# Patient Record
Sex: FEMALE | Race: WHITE | NOT HISPANIC OR LATINO | Employment: OTHER | URBAN - METROPOLITAN AREA
[De-identification: names, ages, dates, MRNs, and addresses within clinical notes are randomized per-mention and may not be internally consistent; named-entity substitution may affect disease eponyms.]

---

## 2019-10-21 ENCOUNTER — HOSPITAL ENCOUNTER (OUTPATIENT)
Dept: GENERAL RADIOLOGY | Facility: HOSPITAL | Age: 84
Discharge: HOME | End: 2019-10-21
Admitting: NURSE PRACTITIONER

## 2019-10-21 ENCOUNTER — TRANSCRIBE ORDERS (OUTPATIENT)
Dept: ADMINISTRATIVE | Facility: HOSPITAL | Age: 84
End: 2019-10-21

## 2019-10-21 DIAGNOSIS — R05.9 COUGH: Primary | ICD-10-CM

## 2019-10-21 PROCEDURE — 71046 X-RAY EXAM CHEST 2 VIEWS: CPT

## 2021-09-12 ENCOUNTER — APPOINTMENT (OUTPATIENT)
Dept: CARDIOLOGY | Facility: HOSPITAL | Age: 86
End: 2021-09-12

## 2021-09-12 ENCOUNTER — APPOINTMENT (OUTPATIENT)
Dept: CT IMAGING | Facility: HOSPITAL | Age: 86
End: 2021-09-12

## 2021-09-12 ENCOUNTER — APPOINTMENT (OUTPATIENT)
Dept: MRI IMAGING | Facility: HOSPITAL | Age: 86
End: 2021-09-12

## 2021-09-12 ENCOUNTER — APPOINTMENT (OUTPATIENT)
Dept: ULTRASOUND IMAGING | Facility: HOSPITAL | Age: 86
End: 2021-09-12

## 2021-09-12 ENCOUNTER — APPOINTMENT (OUTPATIENT)
Dept: GENERAL RADIOLOGY | Facility: HOSPITAL | Age: 86
End: 2021-09-12

## 2021-09-12 ENCOUNTER — HOSPITAL ENCOUNTER (INPATIENT)
Facility: HOSPITAL | Age: 86
LOS: 5 days | Discharge: HOME HEALTH | End: 2021-09-17
Attending: EMERGENCY MEDICINE | Admitting: RADIOLOGY

## 2021-09-12 DIAGNOSIS — I63.9 ISCHEMIC CEREBROVASCULAR ACCIDENT (CVA) (HCC): ICD-10-CM

## 2021-09-12 DIAGNOSIS — I63.9 ACUTE CVA (CEREBROVASCULAR ACCIDENT) (HCC): Primary | ICD-10-CM

## 2021-09-12 DIAGNOSIS — I10 ESSENTIAL HYPERTENSION: ICD-10-CM

## 2021-09-12 DIAGNOSIS — R13.12 OROPHARYNGEAL DYSPHAGIA: ICD-10-CM

## 2021-09-12 DIAGNOSIS — I48.11 LONGSTANDING PERSISTENT ATRIAL FIBRILLATION (HCC): ICD-10-CM

## 2021-09-12 DIAGNOSIS — R41.841 COGNITIVE COMMUNICATION DEFICIT: ICD-10-CM

## 2021-09-12 PROBLEM — I48.21 PERMANENT ATRIAL FIBRILLATION (HCC): Status: ACTIVE | Noted: 2021-09-12

## 2021-09-12 PROBLEM — N18.32 STAGE 3B CHRONIC KIDNEY DISEASE (HCC): Status: ACTIVE | Noted: 2021-09-12

## 2021-09-12 PROBLEM — E11.9 TYPE 2 DIABETES MELLITUS (HCC): Status: ACTIVE | Noted: 2021-09-12

## 2021-09-12 PROBLEM — E03.9 HYPOTHYROIDISM (ACQUIRED): Status: ACTIVE | Noted: 2021-09-12

## 2021-09-12 LAB
ALT SERPL W P-5'-P-CCNC: 12 U/L (ref 1–33)
APTT PPP: 30.2 SECONDS (ref 50–95)
AST SERPL-CCNC: 17 U/L (ref 1–32)
BASE EXCESS BLDA CALC-SCNC: 6 MMOL/L (ref -5–5)
BASOPHILS # BLD AUTO: 0.05 10*3/MM3 (ref 0–0.2)
BASOPHILS NFR BLD AUTO: 0.6 % (ref 0–1.5)
CA-I BLDA-SCNC: 1.21 MMOL/L (ref 1.2–1.32)
CO2 BLDA-SCNC: 31 MMOL/L (ref 24–29)
CREAT BLDA-MCNC: 1.5 MG/DL (ref 0.6–1.3)
DEPRECATED RDW RBC AUTO: 49.4 FL (ref 37–54)
EOSINOPHIL # BLD AUTO: 0.26 10*3/MM3 (ref 0–0.4)
EOSINOPHIL NFR BLD AUTO: 3.1 % (ref 0.3–6.2)
ERYTHROCYTE [DISTWIDTH] IN BLOOD BY AUTOMATED COUNT: 15.9 % (ref 12.3–15.4)
GLUCOSE BLDC GLUCOMTR-MCNC: 104 MG/DL (ref 70–130)
GLUCOSE BLDC GLUCOMTR-MCNC: 257 MG/DL (ref 70–130)
GLUCOSE BLDC GLUCOMTR-MCNC: 262 MG/DL (ref 70–130)
HCO3 BLDA-SCNC: 30 MMOL/L (ref 22–26)
HCT VFR BLD AUTO: 35.3 % (ref 34–46.6)
HCT VFR BLDA CALC: 42 % (ref 38–51)
HGB BLD-MCNC: 11.6 G/DL (ref 12–15.9)
HGB BLDA-MCNC: 14.3 G/DL (ref 12–17)
HOLD SPECIMEN: NORMAL
IMM GRANULOCYTES # BLD AUTO: 0.03 10*3/MM3 (ref 0–0.05)
IMM GRANULOCYTES NFR BLD AUTO: 0.4 % (ref 0–0.5)
INR PPP: 1 (ref 0.8–1.2)
LYMPHOCYTES # BLD AUTO: 1.44 10*3/MM3 (ref 0.7–3.1)
LYMPHOCYTES NFR BLD AUTO: 17.3 % (ref 19.6–45.3)
MCH RBC QN AUTO: 27.8 PG (ref 26.6–33)
MCHC RBC AUTO-ENTMCNC: 32.9 G/DL (ref 31.5–35.7)
MCV RBC AUTO: 84.7 FL (ref 79–97)
MONOCYTES # BLD AUTO: 0.73 10*3/MM3 (ref 0.1–0.9)
MONOCYTES NFR BLD AUTO: 8.8 % (ref 5–12)
NEUTROPHILS NFR BLD AUTO: 5.8 10*3/MM3 (ref 1.7–7)
NEUTROPHILS NFR BLD AUTO: 69.8 % (ref 42.7–76)
NRBC BLD AUTO-RTO: 0 /100 WBC (ref 0–0.2)
PCO2 BLDA: 42.9 MM HG (ref 35–45)
PH BLDA: 7.45 PH UNITS (ref 7.35–7.6)
PLATELET # BLD AUTO: 194 10*3/MM3 (ref 140–450)
PMV BLD AUTO: 10.8 FL (ref 6–12)
PO2 BLDA: 42 MMHG (ref 80–105)
POTASSIUM BLDA-SCNC: 4.6 MMOL/L (ref 3.5–4.9)
PROTHROMBIN TIME: 11.8 SECONDS (ref 12.8–15.2)
QT INTERVAL: 446 MS
QTC INTERVAL: 524 MS
RBC # BLD AUTO: 4.17 10*6/MM3 (ref 3.77–5.28)
SAO2 % BLDA: 80 % (ref 95–98)
SARS-COV-2 RDRP RESP QL NAA+PROBE: NORMAL
SODIUM BLD-SCNC: 136 MMOL/L (ref 138–146)
TROPONIN T SERPL-MCNC: <0.01 NG/ML (ref 0–0.03)
WBC # BLD AUTO: 8.31 10*3/MM3 (ref 3.4–10.8)
WHOLE BLOOD HOLD SPECIMEN: NORMAL
WHOLE BLOOD HOLD SPECIMEN: NORMAL

## 2021-09-12 PROCEDURE — C1769 GUIDE WIRE: HCPCS | Performed by: RADIOLOGY

## 2021-09-12 PROCEDURE — 76775 US EXAM ABDO BACK WALL LIM: CPT

## 2021-09-12 PROCEDURE — 85610 PROTHROMBIN TIME: CPT

## 2021-09-12 PROCEDURE — 92523 SPEECH SOUND LANG COMPREHEN: CPT

## 2021-09-12 PROCEDURE — 84484 ASSAY OF TROPONIN QUANT: CPT

## 2021-09-12 PROCEDURE — 84450 TRANSFERASE (AST) (SGOT): CPT

## 2021-09-12 PROCEDURE — 99291 CRITICAL CARE FIRST HOUR: CPT | Performed by: PSYCHIATRY & NEUROLOGY

## 2021-09-12 PROCEDURE — 93306 TTE W/DOPPLER COMPLETE: CPT | Performed by: INTERNAL MEDICINE

## 2021-09-12 PROCEDURE — 93306 TTE W/DOPPLER COMPLETE: CPT

## 2021-09-12 PROCEDURE — C2628 CATHETER, OCCLUSION: HCPCS | Performed by: RADIOLOGY

## 2021-09-12 PROCEDURE — 82565 ASSAY OF CREATININE: CPT

## 2021-09-12 PROCEDURE — 0 IOPAMIDOL PER 1 ML: Performed by: EMERGENCY MEDICINE

## 2021-09-12 PROCEDURE — 84460 ALANINE AMINO (ALT) (SGPT): CPT

## 2021-09-12 PROCEDURE — C1887 CATHETER, GUIDING: HCPCS | Performed by: RADIOLOGY

## 2021-09-12 PROCEDURE — 63710000001 INSULIN REGULAR HUMAN PER 5 UNITS: Performed by: INTERNAL MEDICINE

## 2021-09-12 PROCEDURE — C1894 INTRO/SHEATH, NON-LASER: HCPCS | Performed by: RADIOLOGY

## 2021-09-12 PROCEDURE — 82947 ASSAY GLUCOSE BLOOD QUANT: CPT

## 2021-09-12 PROCEDURE — 87635 SARS-COV-2 COVID-19 AMP PRB: CPT | Performed by: EMERGENCY MEDICINE

## 2021-09-12 PROCEDURE — 03CG3Z7 EXTIRPATION OF MATTER FROM INTRACRANIAL ARTERY USING STENT RETRIEVER, PERCUTANEOUS APPROACH: ICD-10-PCS | Performed by: RADIOLOGY

## 2021-09-12 PROCEDURE — 82803 BLOOD GASES ANY COMBINATION: CPT

## 2021-09-12 PROCEDURE — C1760 CLOSURE DEV, VASC: HCPCS | Performed by: RADIOLOGY

## 2021-09-12 PROCEDURE — 61645 PERQ ART M-THROMBECT &/NFS: CPT | Performed by: RADIOLOGY

## 2021-09-12 PROCEDURE — 85730 THROMBOPLASTIN TIME PARTIAL: CPT

## 2021-09-12 PROCEDURE — 0042T HC CT CEREBRAL PERFUSION W/WO CONTRAST: CPT

## 2021-09-12 PROCEDURE — C1773 RET DEV, INSERTABLE: HCPCS | Performed by: RADIOLOGY

## 2021-09-12 PROCEDURE — 84132 ASSAY OF SERUM POTASSIUM: CPT

## 2021-09-12 PROCEDURE — 93005 ELECTROCARDIOGRAM TRACING: CPT | Performed by: INTERNAL MEDICINE

## 2021-09-12 PROCEDURE — 99285 EMERGENCY DEPT VISIT HI MDM: CPT

## 2021-09-12 PROCEDURE — 92610 EVALUATE SWALLOWING FUNCTION: CPT

## 2021-09-12 PROCEDURE — 85014 HEMATOCRIT: CPT

## 2021-09-12 PROCEDURE — 99223 1ST HOSP IP/OBS HIGH 75: CPT | Performed by: INTERNAL MEDICINE

## 2021-09-12 PROCEDURE — 70450 CT HEAD/BRAIN W/O DYE: CPT

## 2021-09-12 PROCEDURE — 70498 CT ANGIOGRAPHY NECK: CPT

## 2021-09-12 PROCEDURE — 82330 ASSAY OF CALCIUM: CPT

## 2021-09-12 PROCEDURE — 70496 CT ANGIOGRAPHY HEAD: CPT

## 2021-09-12 PROCEDURE — 85025 COMPLETE CBC W/AUTO DIFF WBC: CPT

## 2021-09-12 PROCEDURE — 93010 ELECTROCARDIOGRAM REPORT: CPT | Performed by: INTERNAL MEDICINE

## 2021-09-12 PROCEDURE — 0 IODIXANOL PER 1 ML: Performed by: RADIOLOGY

## 2021-09-12 PROCEDURE — 84295 ASSAY OF SERUM SODIUM: CPT

## 2021-09-12 PROCEDURE — 93005 ELECTROCARDIOGRAM TRACING: CPT

## 2021-09-12 PROCEDURE — 70551 MRI BRAIN STEM W/O DYE: CPT

## 2021-09-12 RX ORDER — SODIUM CHLORIDE 0.9 % (FLUSH) 0.9 %
10 SYRINGE (ML) INJECTION EVERY 12 HOURS SCHEDULED
Status: DISCONTINUED | OUTPATIENT
Start: 2021-09-12 | End: 2021-09-17 | Stop reason: HOSPADM

## 2021-09-12 RX ORDER — LISINOPRIL 5 MG/1
5 TABLET ORAL DAILY
COMMUNITY
End: 2021-09-17 | Stop reason: HOSPADM

## 2021-09-12 RX ORDER — ASPIRIN 81 MG/1
81 TABLET, CHEWABLE ORAL DAILY
COMMUNITY

## 2021-09-12 RX ORDER — ASPIRIN 81 MG/1
81 TABLET, CHEWABLE ORAL DAILY
Status: DISCONTINUED | OUTPATIENT
Start: 2021-09-12 | End: 2021-09-17 | Stop reason: HOSPADM

## 2021-09-12 RX ORDER — ATORVASTATIN CALCIUM 40 MG/1
80 TABLET, FILM COATED ORAL NIGHTLY
Status: DISCONTINUED | OUTPATIENT
Start: 2021-09-12 | End: 2021-09-17 | Stop reason: HOSPADM

## 2021-09-12 RX ORDER — IODIXANOL 320 MG/ML
INJECTION, SOLUTION INTRAVASCULAR AS NEEDED
Status: DISCONTINUED | OUTPATIENT
Start: 2021-09-12 | End: 2021-09-12 | Stop reason: HOSPADM

## 2021-09-12 RX ORDER — MULTIVIT AND MINERALS-FERROUS GLUCONATE 9 MG IRON/15 ML ORAL LIQUID 9 MG/15 ML
LIQUID (ML) ORAL DAILY
COMMUNITY

## 2021-09-12 RX ORDER — ASPIRIN 300 MG/1
300 SUPPOSITORY RECTAL DAILY
Status: DISCONTINUED | OUTPATIENT
Start: 2021-09-12 | End: 2021-09-14

## 2021-09-12 RX ORDER — LEVOTHYROXINE SODIUM 20 UG/ML
50 INJECTION, SOLUTION INTRAVENOUS
Status: DISCONTINUED | OUTPATIENT
Start: 2021-09-12 | End: 2021-09-13

## 2021-09-12 RX ORDER — SODIUM CHLORIDE 0.9 % (FLUSH) 0.9 %
10 SYRINGE (ML) INJECTION AS NEEDED
Status: DISCONTINUED | OUTPATIENT
Start: 2021-09-12 | End: 2021-09-13

## 2021-09-12 RX ORDER — ATORVASTATIN CALCIUM 10 MG/1
10 TABLET, FILM COATED ORAL DAILY
COMMUNITY
End: 2021-09-17 | Stop reason: HOSPADM

## 2021-09-12 RX ORDER — SODIUM CHLORIDE 0.9 % (FLUSH) 0.9 %
10 SYRINGE (ML) INJECTION AS NEEDED
Status: DISCONTINUED | OUTPATIENT
Start: 2021-09-12 | End: 2021-09-17 | Stop reason: HOSPADM

## 2021-09-12 RX ORDER — PANTOPRAZOLE SODIUM 40 MG/1
40 TABLET, DELAYED RELEASE ORAL DAILY
COMMUNITY

## 2021-09-12 RX ORDER — LIDOCAINE HYDROCHLORIDE 10 MG/ML
INJECTION, SOLUTION EPIDURAL; INFILTRATION; INTRACAUDAL; PERINEURAL AS NEEDED
Status: DISCONTINUED | OUTPATIENT
Start: 2021-09-12 | End: 2021-09-12 | Stop reason: HOSPADM

## 2021-09-12 RX ORDER — LEVOTHYROXINE SODIUM 0.05 MG/1
25 TABLET ORAL DAILY
COMMUNITY

## 2021-09-12 RX ORDER — METOPROLOL TARTRATE 5 MG/5ML
5 INJECTION INTRAVENOUS EVERY 6 HOURS PRN
Status: DISCONTINUED | OUTPATIENT
Start: 2021-09-12 | End: 2021-09-15

## 2021-09-12 RX ORDER — DEXTROSE MONOHYDRATE 25 G/50ML
25 INJECTION, SOLUTION INTRAVENOUS
Status: DISCONTINUED | OUTPATIENT
Start: 2021-09-12 | End: 2021-09-14

## 2021-09-12 RX ORDER — NICOTINE POLACRILEX 4 MG
15 LOZENGE BUCCAL
Status: DISCONTINUED | OUTPATIENT
Start: 2021-09-12 | End: 2021-09-14

## 2021-09-12 RX ADMIN — ATORVASTATIN CALCIUM 80 MG: 40 TABLET, FILM COATED ORAL at 21:44

## 2021-09-12 RX ADMIN — IOPAMIDOL 115 ML: 755 INJECTION, SOLUTION INTRAVENOUS at 11:50

## 2021-09-12 RX ADMIN — LEVOTHYROXINE SODIUM 50 MCG: 20 INJECTION, SOLUTION INTRAVENOUS at 18:37

## 2021-09-12 RX ADMIN — ASPIRIN 81 MG CHEWABLE TABLET 81 MG: 81 TABLET CHEWABLE at 18:38

## 2021-09-12 RX ADMIN — INSULIN HUMAN 6 UNITS: 100 INJECTION, SOLUTION PARENTERAL at 18:38

## 2021-09-12 NOTE — H&P
ICU ADMISSION NOTE    Chief complaint   Left middle cerebral artery stroke  Atrial fibrillation on Eliquis    Subjective     Patient is a 85 y.o. female with diabetes mellitus type 2, hypertension, chronic atrial fibrillation on Eliquis, found by family unable to speak this morning.  She was in her normal state of health going to bed last night around 10 PM.  She awoke and was sitting at the table attempting to eat breakfast when her son came to check on her around 10 and found that she was unable to speak and had a right facial droop.  NIH stroke score was a 10 in the emergency room.  Scanning revealed a 1.1 cm lesion in the left temporal region suspicious for meningioma, small area of reversible ischemia in the periphery of the first branch of the left MCA.  She was taken to the Cath Lab and underwent a successful thrombectomy.  She is followed at the [facility] and was hospitalized in [place] July 3 with shortness of air, atrial fibrillation, congestive heart failure and a kidney stone.  At that time she reportedly had bilateral pleural effusions congestive heart failure and chronic atrial fibrillation.  She was treated for heart failure and sent to [place] for her kidney stone which she did successfully pass.  She reports that she is supposed to see a cardiologist about a possible pacemaker.  She has the atrial fibrillation and slow heart rate at times but she did not want to get a pacemaker.  At follow-up July 12 lisinopril and Eliquis was stopped.  She also has a history of an MI with heart catheterization and stent 10 or 12 years ago.  She is not sure why her Plavix was stopped.  Her serum creatinine was 1.46 with a GFR of 34.  Her A1c was 8.3 and her TSH was 4.3.  In our emergency room troponin was negative, hemoglobin was 11.6, Covid was negative, and the rest of the labs are pending.    Review of Systems  Review of Systems   Constitutional: Positive for activity change. Negative for  fever.   HENT: Positive for trouble swallowing.    Eyes: Negative for visual disturbance.   Respiratory: Negative for cough and shortness of breath.    Cardiovascular: Positive for palpitations. Negative for chest pain and leg swelling.   Gastrointestinal: Negative for abdominal pain, diarrhea, nausea and vomiting.   Endocrine: Positive for polydipsia.   Genitourinary: Negative for dysuria.   Musculoskeletal: Negative.    Skin: Negative.    Allergic/Immunologic: Negative.    Neurological: Positive for weakness and numbness.   Hematological: Negative.    Psychiatric/Behavioral: Negative.         Home Medications  No medications prior to admission.   Protonix 40 mg daily  Lopressor 25 mg twice daily  Lantus 35 units every morning  Lipitor 10 mg daily  Aspirin 81 mg daily  Eliquis 2.5 mg daily  Demadex 40 mg daily  Synthroid 50 mcg daily    History  Past Medical History:   Diagnosis Date   • Congestive heart failure (CHF) (CMS/[facility])    • Coronary artery disease     MI,  or    • Essential hypertension 2021   • Hypothyroid    • Hypothyroidism (acquired) 2021   • Nephrolithiasis    • Permanent atrial fibrillation (CMS/[facility]) on Eliquis 2021   • Type 2 diabetes mellitus (CMS/HCC) 2021     Past Surgical History:   Procedure Laterality Date   • APPENDECTOMY      Performed at the time of her hysterectomy   • CARDIAC CATHETERIZATION       or  with stent she does not know details   • CATARACT EXTRACTION, BILATERAL     •  SECTION      X2   • TONSILLECTOMY     • TOTAL ABDOMINAL HYSTERECTOMY WITH SALPINGO OOPHORECTOMY      For ovarian cancer     History reviewed. No pertinent family history.  Social History     Tobacco Use   • Smoking status: Never Smoker   • Smokeless tobacco: Never Used   Substance Use Topics   • Alcohol use: Never   • Drug use: Not on file     No medications prior to admission.     Allergies:  Patient has no known allergies.      Objective     Vital Signs  Blood pressure  "113/49, pulse 80, temperature 97.9 °F (36.6 °C), temperature source Axillary, resp. rate 16, height 149.9 cm (59\"), weight 68 kg (150 lb), SpO2 97 %.    Physical Exam:  General Appearance:   Elderly woman upright in bed in no acute distress   Head:   Normocephalic, atraumatic   Eyes:          Pupils equal and reactive to light.  Conjunctiva pink.   Ears:     Throat:  Oral mucosa moist, dentures in place   Neck:  Trachea midline, limited range of motion, no carotid bruit   Back:      Lungs:    Symmetric chest expansion without rhonchi, diminished at the bases bilaterally worse on the left    Heart:   Irregular, S1, S2 present   Abdomen:    Bowel sounds present soft nontender   Rectal:     Deferred   Extremities:    No pretibial edema   Pulses:  Pedal pulses present   Skin:  Warm and dry   Lymph nodes:  No cervical adenopathy   Neurologic:  Alert, expressive aphasia, right facial droop.  No motor drift upper or lower extremities       Results Review:   Lab Results (last 24 hours)     Procedure Component Value Units Date/Time    [place] Draw [[id number]] Collected: 09/12/21 1212    Specimen: Blood Updated: 09/12/21 1315    Narrative:      The following orders were created for panel order [place] Draw.  Procedure                               Abnormality         Status                     ---------                               -----------         ------                     Green Top (Gel)[[id number]]                                  Final result               Lavender Top[[id number]]                                     Final result               Gold Top - SST[[id number]]                                   Final result               Jacobsen Top[[id number]]                                         In process                 Light Blue Top[[id number]]                                   Final result                 Please view results for these tests on the individual orders.    Light Blue Top [[id number]] Collected: 09/12/21 1212    " Specimen: Blood Updated: 09/12/21 1315     Extra Tube hold for add-on     Comment: Auto resulted       Lavender Top [[id number]] Collected: 09/12/21 1212    Specimen: Blood Updated: 09/12/21 1315     Extra Tube hold for add-on     Comment: Auto resulted       Gold Top - SST [[id number]] Collected: 09/12/21 1212    Specimen: Blood Updated: 09/12/21 1315     Extra Tube Hold for add-ons.     Comment: Auto resulted.       Green Top (Gel) [[id number]] Collected: 09/12/21 1212    Specimen: Blood Updated: 09/12/21 1315     Extra Tube Hold for add-ons.     Comment: Auto resulted.       COVID PRE-OP / PRE-PROCEDURE SCREENING ORDER (NO ISOLATION) - Swab, Nasopharynx [[id number]]  (Normal) Collected: 09/12/21 1156    Specimen: Swab from Nasopharynx Updated: 09/12/21 1239    Narrative:      The following orders were created for panel order COVID PRE-OP / PRE-PROCEDURE SCREENING ORDER (NO ISOLATION) - Swab, Nasopharynx.  Procedure                               Abnormality         Status                     ---------                               -----------         ------                     COVID-19, ABBOTT IN-HOUS...[[id number]]  Normal              Final result                 Please view results for these tests on the individual orders.    COVID-19, ABBOTT IN-HOUSE,NASAL Swab (NO TRANSPORT MEDIA) 2 HR TAT - Swab, Nasopharynx [[id number]]  (Normal) Collected: 09/12/21 1156    Specimen: Swab from Nasopharynx Updated: 09/12/21 1239     COVID19 Presumptive Negative    Narrative:      Fact sheet for providers: https://www.fda.gov/media/864254/download     Fact sheet for patients: https://www.fda.gov/media/408618/download    Test performed by PCR.  If inconsistent with clinical signs and symptoms patient should be tested with different authorized molecular test.    AST [[id number]]  (Normal) Collected: 09/12/21 1212    Specimen: Blood Updated: 09/12/21 1239     AST (SGOT) 17 U/L     ALT [[id number]]  (Normal) Collected: 09/12/21 1212     Specimen: Blood Updated: 09/12/21 1239     ALT (SGPT) 12 U/L     Troponin [[id number]]  (Normal) Collected: 09/12/21 1212    Specimen: Blood Updated: 09/12/21 1239     Troponin T <0.010 ng/mL     Narrative:      Troponin T Reference Range:  <= 0.03 ng/mL-   Negative for AMI  >0.03 ng/mL-     Abnormal for myocardial necrosis.  Clinicians would have to utilize clinical acumen, EKG, Troponin and serial changes to determine if it is an Acute Myocardial Infarction or myocardial injury due to an underlying chronic condition.       Results may be falsely decreased if patient taking Biotin.      aPTT [[id number]]  (Abnormal) Collected: 09/12/21 1212    Specimen: Blood Updated: 09/12/21 1234     PTT 30.2 seconds     Narrative:      PTT = The equivalent PTT values for the therapeutic range of heparin levels at 0.3 to 0.5 U/ml are 55 to 70 seconds.    CBC & Differential [[id number]]  (Abnormal) Collected: 09/12/21 1212    Specimen: Blood Updated: 09/12/21 1217    Narrative:      The following orders were created for panel order CBC & Differential.  Procedure                               Abnormality         Status                     ---------                               -----------         ------                     CBC Auto Differential[[id number]]        Abnormal            Final result                 Please view results for these tests on the individual orders.    CBC Auto Differential [[id number]]  (Abnormal) Collected: 09/12/21 1212    Specimen: Blood Updated: 09/12/21 1217     WBC 8.31 10*3/mm3      RBC 4.17 10*6/mm3      Hemoglobin 11.6 g/dL      Hematocrit 35.3 %      MCV 84.7 fL      MCH 27.8 pg      MCHC 32.9 g/dL      RDW 15.9 %      RDW-SD 49.4 fl      MPV 10.8 fL      Platelets 194 10*3/mm3      Neutrophil % 69.8 %      Lymphocyte % 17.3 %      Monocyte % 8.8 %      Eosinophil % 3.1 %      Basophil % 0.6 %      Immature Grans % 0.4 %      Neutrophils, Absolute 5.80 10*3/mm3      Lymphocytes, Absolute 1.44 10*3/mm3       Monocytes, Absolute 0.73 10*3/mm3      Eosinophils, Absolute 0.26 10*3/mm3      Basophils, Absolute 0.05 10*3/mm3      Immature Grans, Absolute 0.03 10*3/mm3      nRBC 0.0 /100 WBC     POC Creatinine [[id number]]  (Abnormal) Collected: 09/12/21 1154    Specimen: Blood Updated: 09/12/21 1215     Creatinine 1.50 mg/dL      Comment: Serial Number: [id number]:  [id number]       Gray Top [[id number]] Collected: 09/12/21 1212    Specimen: Blood Updated: 09/12/21 1214    POC Protime / INR [[id number]]  (Abnormal) Collected: 09/12/21 1156    Specimen: Blood Updated: 09/12/21 1200     Protime 11.8 seconds      INR 1.0     Comment: Serial Number: [id number]:  [id number]       POC Surgery Labs [[id number]]  (Abnormal) Collected: 09/12/21 1154    Specimen: Blood Updated: 09/12/21 1159     Ionized Calcium 1.21 mmol/L      POC Potassium 4.6 mmol/L      Sodium 136 mmol/L      Total CO2 31 mmol/L      Hemoglobin 14.3 g/dL      Hematocrit 42 %      pCO2, Arterial 42.9 mm Hg      pO2, Arterial 42 mmHg      Comment: Serial Number: [id number]:  [id number]        Base Excess 6.0000 mmol/L      O2 Saturation, Arterial 80 %      pH, Arterial 7.45 pH units      HCO3, Arterial 30.0 mmol/L      Glucose 262 mg/dL         Imaging Results (Last 24 Hours)     Procedure Component Value Units Date/Time    CT Angiogram Neck [[id number]] Collected: 09/12/21 1213     Updated: 09/12/21 1217    Narrative:      EXAMINATION: CT ANGIOGRAM HEAD W AI ANALYSIS OF LVO-, CT ANGIOGRAM NECK-        INDICATION: STROKE a aphasia     TECHNIQUE: Multiple axial CT imaging is obtained of the head and neck  following the ministration of intravenous contrast according to the CT  angiographic protocol. Three-D reformatted images persuaded to further  facilitate diagnostic accuracy and treatment planning.     The radiation dose reduction device was turned on for each scan per the  ALARA (As Low as Reasonably Achievable) protocol.     AI analysis of LVO was utilized.      COMPARISON: NONE     FINDINGS: Lung apices are clear. Thoracic aortic arch is unremarkable.  Both common carotid arteries are without significant stenosis. There is  no significant atherosclerotic disease in either the carotid  bifurcations. The vertebral arteries are symmetric in size with some  tortuosity with no significant stenosis present. The distal vertebral  arteries are without significant narrowing. The basilar artery is  patent. Both posterior cerebral arteries are intact and unremarkable.     The intracranial internal carotid arteries reveal mild atherosclerotic  disease. The right middle cerebral arteries intact and unremarkable in  appearance. No significant stenosis identified. The left middle cerebral  artery reveals no significant stenosis in the M1 or M2 branches. There  is a homogeneously enhancing mass in the left temporal region anteriorly  suggesting possibly a small meningioma measuring 1.1 cm. Both anterior  cerebral arteries are unremarkable in appearance.             Impression:      No significant stenosis of the vessels within the head or  neck. There is a small 1.1 cm homogeneously enhancing mass anteriorly  within the left temporal region suggesting a meningioma. No evidence of  visualized intracranial stenosis.             CT Angiogram Head w AI Analysis of LVO [[id number]] Collected: 09/12/21 1213     Updated: 09/12/21 1217    Narrative:      EXAMINATION: CT ANGIOGRAM HEAD W AI ANALYSIS OF LVO-, CT ANGIOGRAM NECK-        INDICATION: STROKE a aphasia     TECHNIQUE: Multiple axial CT imaging is obtained of the head and neck  following the ministration of intravenous contrast according to the CT  angiographic protocol. Three-D reformatted images persuaded to further  facilitate diagnostic accuracy and treatment planning.     The radiation dose reduction device was turned on for each scan per the  ALARA (As Low as Reasonably Achievable) protocol.     AI analysis of LVO was utilized.      COMPARISON: NONE     FINDINGS: Lung apices are clear. Thoracic aortic arch is unremarkable.  Both common carotid arteries are without significant stenosis. There is  no significant atherosclerotic disease in either the carotid  bifurcations. The vertebral arteries are symmetric in size with some  tortuosity with no significant stenosis present. The distal vertebral  arteries are without significant narrowing. The basilar artery is  patent. Both posterior cerebral arteries are intact and unremarkable.     The intracranial internal carotid arteries reveal mild atherosclerotic  disease. The right middle cerebral arteries intact and unremarkable in  appearance. No significant stenosis identified. The left middle cerebral  artery reveals no significant stenosis in the M1 or M2 branches. There  is a homogeneously enhancing mass in the left temporal region anteriorly  suggesting possibly a small meningioma measuring 1.1 cm. Both anterior  cerebral arteries are unremarkable in appearance.             Impression:      No significant stenosis of the vessels within the head or  neck. There is a small 1.1 cm homogeneously enhancing mass anteriorly  within the left temporal region suggesting a meningioma. No evidence of  visualized intracranial stenosis.             CT CEREBRAL PERFUSION WITH & WITHOUT CONTRAST [[id number]] Collected: 09/12/21 1208     Updated: 09/12/21 1210    Narrative:      EXAMINATION: CT CEREBRAL PERFUSION W WO CONTRAST-      INDICATION: STROKE stroke symptoms, right-sided weakness, ectasia     TECHNIQUE: Cerebral perfusion analysis was performed using computed  tomography with contrast administration, including post processing of  parametric maps with determination of cerebral blood flow, cerebral  blood volume, and mean transit time.     The radiation dose reduction device was turned on for each scan per the  ALARA (As Low as Reasonably Achievable) protocol.     COMPARISON: NONE     FINDINGS: There is  decreased cerebral blood flow identified within the  left parietal lobe and some diminished blood volume centrally with what  appears to be reversible ischemia identified in the periphery of the  area of infarction. There is been transient time to suggest an occlusion  or stenosis in one of the  left middle cerebral artery branches. There  is no mismatch volume of 19 mL.             Impression:      Small area of reversible ischemia seen in the periphery of  the area of insult in one of the branches of left MCA territory.                   CT Head Without Contrast Stroke Protocol [[id number]] Collected: 09/12/21 1149     Updated: 09/12/21 1150    Narrative:      EXAMINATION: CT HEAD WO CONTRAST STROKE PROTOCOL-      INDICATION: Neuro deficit, acute, stroke suspected stroke symptoms,  ectasia     TECHNIQUE: Axial CT imaging is obtained of the head from skull base of  skull vertex without the ministration of intravenous contrast.     The radiation dose reduction device was turned on for each scan per the  ALARA (As Low as Reasonably Achievable) protocol.     COMPARISON: NONE     FINDINGS: Brain parenchyma reveals mild atrophy. Low-density area seen  in the periventricular and subcortical white matter. No hemorrhage or  hydrocephalus. No mass, mass effect, midline shift. No abnormal  extra-axial fluid collection's identified. Mucosal thickening filling  the right maxillary sinus. Mastoid air cells are patent. Remainder the  visualized paranasal sinuses are clear. Globes and of its are intact.             Impression:      Atrophy and chronic changes seen within the brain with no  acute intracranial normality. Chronic right maxillary sinusitis.     Examination was performed 09/12/2021 at 11:43 AM in examination results  were given to the emergency room physician 09/12/2021 at 11:49 AM                 PROBLEM LIST  .  Patient Active Problem List   Diagnosis   • Ischemic cerebrovascular accident (CVA) (CMS/[facility])   •  Essential hypertension   • Type 2 diabetes mellitus (CMS/HCC)   • Longstanding persistent atrial fibrillation (CMS/HCC)   • Stage 3b chronic kidney disease (CMS/HCC)   • Hypothyroidism (acquired)       Assessment/Plan     84-year-old woman with diabetes, hypertension, atrial fibrillation, suspected tachybradycardia syndrome, congestive heart failure, coronary artery disease, hypothyroid, chronic kidney disease now hospitalized with an acute CVA with expressive aphasia and right-sided weakness.  She underwent thrombectomy with improvement in her NIH stroke score from a 10 to an 8.  She apparently stopped her Eliquis in July.  Lisinopril was also stopped I suspect because of her renal insufficiency.  Her serum creatinine was 1.5 in our emergency room and 1.46 in July at her primary care physicians.  She now has what appears to be an embolic stroke.  Postprocedure she has some persistent expressive aphasia but no weakness of her extremities.  She also has persistent facial droop.  She does take Lantus 35 units daily for her diabetes.  Her A1c in July was 8.3.    Maintain systolic blood pressure 120-160  Start anticoagulation in 24 hours  Echocardiogram  Chest x-ray  Urinalysis  Renal ultrasound  Intermittent hydralazine versus Cardene for hypertension  Speech therapy evaluated and she would likely get a fees tomorrow  Sliding scale insulin  Hemoglobin A1c  Thyroid replaced  Aspirin, statin, Plavix        I discussed the patients findings and my recommendations with patient, son, consulting neurologist    Maite Ulloa MD  09/12/21  15:35 EDT    Time: 15 minutes    This note was produced with a voice recognition program and may have uncorrected errors.

## 2021-09-12 NOTE — THERAPY EVALUATION
Acute Care - Speech Language Pathology Initial Evaluation  [facility] Cognitive-Communication Evaluation       Patient Name: Sissy Ballard  : 1935  MRN: [id number]  Today's Date: 2021               Admit Date: 2021     Visit Dx:    ICD-10-CM ICD-9-CM   1. Acute CVA (cerebrovascular accident) (CMS/HCC)  I63.9 434.91     Patient Active Problem List   Diagnosis   • Ischemic cerebrovascular accident (CVA) (CMS/HCC)   • Essential hypertension   • Type 2 diabetes mellitus (CMS/HCC)   • Longstanding persistent atrial fibrillation (CMS/HCC)   • Stage 3b chronic kidney disease (CMS/HCC)   • Hypothyroidism (acquired)     Past Medical History:   Diagnosis Date   • Congestive heart failure (CHF) (CMS/HCC)    • Coronary artery disease     MI,  or    • Essential hypertension 2021   • Hypothyroid    • Hypothyroidism (acquired) 2021   • Nephrolithiasis    • Permanent atrial fibrillation (CMS/HCC) on Eliquis 2021   • Type 2 diabetes mellitus (CMS/HCC) 2021     Past Surgical History:   Procedure Laterality Date   • APPENDECTOMY      Performed at the time of her hysterectomy   • CARDIAC CATHETERIZATION       or  with stent she does not know details   • CATARACT EXTRACTION, BILATERAL     •  SECTION      X2   • TONSILLECTOMY     • TOTAL ABDOMINAL HYSTERECTOMY WITH SALPINGO OOPHORECTOMY      For ovarian cancer       SLP Recommendation and Plan  SLP Diagnosis: Pt presents with verbal apraxia and aphasia.  Difficulty with automatic speech tasks and repetition.  Attempts to speak but recognizable speech limited to one word with some degree interpretation. Also with dysarthria and aphasia component.  Auditory comprehension appears intact.  Reading is functional at the word level.  Writing was not assessed. SLP to treat speech/language deficits.        Swallow Criteria for Skilled Therapeutic Interventions Met: demonstrates skilled criteria  SLC Criteria for Skilled Therapy  Interventions Met: yes  Anticipated Discharge Disposition (SLP): inpatient rehabilitation facility     Therapy Frequency (Swallow): other (see comments) (Pending reevaluation)  Predicted Duration Therapy Intervention (Days): until discharge     Plan for Continued Treatment (SLP): SLP to treat communication impairment.       Plan of Care Reviewed With: patient, son  Outcome Summary: Evaluated swallowing and speech/language function.  Pt with impaired swallowing with the recommendation for NPO except meds with applesauce pending reevaluation tomorrow.  Pt's comprehension appears intact, however she exhibits apraxia of speech with dysarthria and aphasia component.  SLP to follow up tomorrow for a swallow reevaluation and ST treatment.         SLP EVALUATION (last 72 hours)      SLP SLC Evaluation     Row Name 09/12/21 1400                   Communication Assessment/Intervention    Document Type  evaluation  -ML        Subjective Information  no complaints  -ML        Patient Observations  alert;cooperative;agree to therapy  -ML        Patient/Family/Caregiver Comments/Observations  Son in room  -ML        Care Plan Review  evaluation/treatment results reviewed;care plan/treatment goals reviewed  -ML        Patient Effort  excellent  -ML        Comment  Food particles from home breakfast in oral cavity. Dentures in place/not removed for thrombectomy.  -ML        Symptoms Noted During/After Treatment  none  -ML           General Information    Patient Profile Reviewed  yes  -ML        Pertinent History Of Current Problem  Pt is s/p thrombectomy. Acute left MCA CVA. Hx DM/HTN.   -ML        Precautions/Limitations, Vision  corrective lenses needed for reading  -ML        Precautions/Limitations, Hearing  WFL;for purposes of eval  -ML        Patient Level of Education  12th grade  -ML        Prior Level of Function-Communication  WFL  -ML        Plans/Goals Discussed with  patient and family;agreed upon  -ML        Barriers  to Rehab  none identified  -ML           Pain    Additional Documentation  Pain Scale: Numbers Pre/Post-Treatment (Group)  -ML           Pain Scale: Numbers Pre/Post-Treatment    Pretreatment Pain Rating  0/10 - no pain  -ML           Comprehension Assessment/Intervention    Comprehension Assessment/Intervention  Auditory Comprehension  -ML           Auditory Comprehension Assessment/Intervention    Auditory Comprehension (Communication)  WFL  -ML        Able to Identify Objects/Pictures (Communication)  body part;familiar objects;WNL  -ML        Answers Questions (Communication)  yes/no;personal;simple;WFL expressive language and motor speech impairment impacts.  -ML        Able to Follow Commands (Communication)  2-step;WFL  -ML        Auditory Comprehension Communication, Comment  Apraxia and aphasia impacts extent of response to questions  -ML           Expression Assessment/Intervention    Expression Assessment/Intervention  verbal expression  -ML           Verbal Expression Assessment/Intervention    Verbal Expression  moderate impairment  -ML        Automatic Speech (Communication)  counting 1-20;days of week;months of year;severe impairment  -ML        Repetition  words;severe impairment Apraxia  -ML        Phrase Completion  automatic/predictable;mild impairment  -ML        Responsive Naming  moderate impairment  -ML        Confrontational Naming  high frequency;moderate impairment  -ML        Sentence Formulation  severe impairment;simple  -ML           Oral Motor Structure and Function    Oral Motor Structure and Function  mild impairment  -ML        Dentition Assessment  upper dentures/partial in place  -ML        Mucosal Quality  dry  -ML           Oral Musculature and Cranial Nerve Assessment    Oral Motor General Assessment  oral labial or buccal impairment  -ML        Oral Labial or Buccal Impairment, Detail, Cranial Nerve VII (Facial):  right labial droop  -ML           Motor Speech  Assessment/Intervention    Motor Speech Function  mild impairment  -ML        Characteristics Consistent with Dysarthria  slurred speech  -ML        Characteristics Consistent with Apraxia  inconsistent errors;vowel distortion;distortions;substitutions  -ML        Initiation of Phonation (Communication)  voluntary;involuntary - (e.g. sneezing, laughing, yawning);WNL  -ML        Automatic Speech (Communication)  counting 1-20;days of week;months of year;severe impairment  -ML        Verbal Repetition (Communication)  monosyllabic words;polysyllabic words;moderate impairment  -ML        Phase Completion  automatic/predictable;mild impairment  -ML        Conversational Speech (Communication)  severe impairment  -ML        Motor Speech, Comment  Aware of deficits but unable to correct.  -ML           SLP Clinical Impressions    SLP Diagnosis  Pt presents with verbal apraxia and aphasia.  Difficulty with automatic speech tasks and repetition.  Attempts to speak but recognizable speech limited to one word with some degree interpretation. Also with dysarthria and aphasia component.  Auditory comprehension appears intact.  Reading is functional at the word level.  Writing was not assessed. SLP to treat speech/language deficits.  -ML        Rehab Potential/Prognosis  good  -ML        SLC Criteria for Skilled Therapy Interventions Met  yes  -ML        Functional Impact  difficulty communicating wants, needs;difficulty in expressing complex messages;difficulty communicating in an emergency  -ML        Plan for Continued Treatment (SLP)  SLP to treat communication impairment.  -ML           Recommendations    Therapy Frequency (SLP SLC)  4 days per week;5 days per week  -ML        Predicted Duration Therapy Intervention (Days)  until discharge  -ML        Anticipated Discharge Disposition (SLP)  inpatient rehabilitation facility  -ML           Communication Treatment Objective and Progress Goals (SLP)    Verbal Expression  Treatment Objectives  Verbal Expression Treatment Objectives (Group)  -ML        Graphic Expression Treatment Objectives  Graphic Expression Treatment Objectives (Group)  -ML        Motor Speech/Dysarthria Treatment Objectives  Motor Speech/Dysarthria Treatment Objectives (Group)  -ML        Motor Speech/Apraxia Treatment Objectives  Motor Speech/Apraxia Treatment Objectives (Group)  -ML        Additional Goals  Additional Goals (Group)  -ML           Verbal Expression Treatment Objectives    Word Retrieval Skills Selection  word retrieval, SLP goal 1  -ML        Phrase and Sentence Level Response Selection  --  -ML           Word Retrieval Skills Goal 1 (SLP)    Improve Word Retrieval Skills By Goal 1 (SLP)  completing automatic speech task, counting;completing automatic speech task, alphabet;completing automatic speech task, days of the week;completing automatic speech task, months;confrontational naming task;repeating words;repeating phrases;70%;with moderate cues (50-74%)  -ML        Time Frame (Word Retrieval Goal 1, SLP)  short term goal (STG);by discharge  -        Barriers (Word Retrieval Goal 1, SLP)  Apraxia  -ML           Graphic Expression Treatment Objectives    Graphic Expression of Shapes, Letters and Numbers Selection  graphic expression of shapes, letters, and numbers, SLP goal (free text)  -ML           Graphic Expression of Shapes, Letters, Numbers Goal (SLP)    Improve Graphic Expression of Shapes, Letters, and Numbers Goal (SLP)  Probe writing when condition warrants  -ML        Time Frame (Graphic Expression of Shapes, Letters, and Numbers Goal, SLP)  by discharge  -           Motor Speech/Dysarthria Treatment Objectives    Articulation Selection  articulation, SLP goal 1  -ML           Articulation Goal 1 (SLP)    Improve Articulation Goal 1 (SLP)  by over-articulating at word level;90%;with moderate cues (50-74%)  -ML        Time Frame (Articulation Goal 1, SLP)  short term goal (STG);by  discharge  -ML           Motor Speech/Apraxia Treatment Objectives    Motor Planning Selection  --  -ML        Planning and Execution of Connected Speech Selection  planning and execution of connected speech, SLP goal 1  -ML           Planning and Execution of Connected Speech Goal 1 (SLP)    Improve Planning and Execution of Connected Speech by Goal 1 (SLP)  imitating one syllable words;imitating two syllable words;completing open-ended sentences;repeating phrases;90%;with moderate cues (50-74%)  -ML        Time Frame (Planning and Execution of Connected Speech Goal 1, SLP)  short term goal (STG);by discharge  -ML           Additional Goals    Additional Goal Selection  additional goal, SLP goal (free text)  -ML           Additional Goal (SLP)    Additional Goal, SLP  LTG: Improve verbal communication  -ML        Time Frame (Additional Goal, SLP)  by discharge  -ML          User Key  (r) = Recorded By, (t) = Taken By, (c) = Cosigned By    Initials Name Effective Dates    ML Silva Hooks, CCC-SLP 06/16/21 -              EDUCATION  The patient has been educated in the following areas:     Communication Impairment.          SLP GOALS     Row Name 09/12/21 1400             Word Retrieval Skills Goal 1 (SLP)    Improve Word Retrieval Skills By Goal 1 (SLP)  completing automatic speech task, counting;completing automatic speech task, alphabet;completing automatic speech task, days of the week;completing automatic speech task, months;confrontational naming task;repeating words;repeating phrases;70%;with moderate cues (50-74%)  -ML      Time Frame (Word Retrieval Goal 1, SLP)  short term goal (STG);by discharge  -ML      Barriers (Word Retrieval Goal 1, SLP)  Apraxia  -ML         Graphic Expression of Shapes, Letters, Numbers Goal (SLP)    Improve Graphic Expression of Shapes, Letters, and Numbers Goal (SLP)  Probe writing when condition warrants  -ML      Time Frame (Graphic Expression of Shapes, Letters, and  Numbers Goal, SLP)  by discharge  -ML         Articulation Goal 1 (SLP)    Improve Articulation Goal 1 (SLP)  by over-articulating at word level;90%;with moderate cues (50-74%)  -ML      Time Frame (Articulation Goal 1, SLP)  short term goal (STG);by discharge  -ML         Planning and Execution of Connected Speech Goal 1 (SLP)    Improve Planning and Execution of Connected Speech by Goal 1 (SLP)  imitating one syllable words;imitating two syllable words;completing open-ended sentences;repeating phrases;90%;with moderate cues (50-74%)  -ML      Time Frame (Planning and Execution of Connected Speech Goal 1, SLP)  short term goal (STG);by discharge  -ML         Additional Goal (SLP)    Additional Goal, SLP  LTG: Improve verbal communication  -ML      Time Frame (Additional Goal, SLP)  by discharge  -ML        User Key  (r) = Recorded By, (t) = Taken By, (c) = Cosigned By    Initials Name Provider Type    Silva Arellano CCC-SLP Speech and Language Pathologist                  Time Calculation:     Time Calculation- SLP     Row Name 09/12/21 1605 09/12/21 1604          Time Calculation- SLP    SLP Start Time  1400  -ML  1400  -ML     SLP Received On  09/12/21  -ML  09/12/21  -ML        Untimed Charges    SLP Eval/Re-eval   ST Eval Speech and Production w/ Language - 60353;ST Eval Oral Pharyng Swallow - 30841  -ML  --     40640-QR Eval Speech and Production w/ Language Minutes  60  -ML  --     35267-CP Eval Oral Pharyng Swallow Minutes  60  -ML  --        Total Minutes    Untimed Charges Total Minutes  120  -ML  --      Total Minutes  120  -ML  --       User Key  (r) = Recorded By, (t) = Taken By, (c) = Cosigned By    Initials Name Provider Type     Silva Hooks CCC-SLP Speech and Language Pathologist          Therapy Charges for Today     Code Description Service Date Service Provider Modifiers Qty    86993180817  ST EVAL ORAL PHARYNG SWALLOW 4 9/12/2021 Silva Hooks CCC-SLP GN 1     [id number] [facility] SPEECH AND PROD W LANG  4 2021 Silva Hooks, CCC-SLP GN 1                     Silva Hooks CCC-SLP  2021 and [facility] Care - Speech Language Pathology   Swallow Initial Evaluation [facility] Clinical Swallow Evaluation       Patient Name: Sissy Ballard  : 1935  MRN: [id number]  Today's Date: 2021               Admit Date: 2021    Visit Dx:     ICD-10-CM ICD-9-CM   1. Acute CVA (cerebrovascular accident) (CMS/HCC)  I63.9 434.91     Patient Active Problem List   Diagnosis   • Ischemic cerebrovascular accident (CVA) (CMS/HCC)   • Essential hypertension   • Type 2 diabetes mellitus (CMS/HCC)   • Longstanding persistent atrial fibrillation (CMS/HCC)   • Stage 3b chronic kidney disease (CMS/HCC)   • Hypothyroidism (acquired)     Past Medical History:   Diagnosis Date   • Congestive heart failure (CHF) (CMS/HCC)    • Coronary artery disease     MI,  or    • Essential hypertension 2021   • Hypothyroid    • Hypothyroidism (acquired) 2021   • Nephrolithiasis    • Permanent atrial fibrillation (CMS/HCC) on Eliquis 2021   • Type 2 diabetes mellitus (CMS/HCC) 2021     Past Surgical History:   Procedure Laterality Date   • APPENDECTOMY      Performed at the time of her hysterectomy   • CARDIAC CATHETERIZATION       or  with stent she does not know details   • CATARACT EXTRACTION, BILATERAL     •  SECTION      X2   • TONSILLECTOMY     • TOTAL ABDOMINAL HYSTERECTOMY WITH SALPINGO OOPHORECTOMY      For ovarian cancer       SLP Recommendation and Plan  SLP Swallowing Diagnosis: oral dysphagia, pharyngeal dysphagia  SLP Diet Recommendation: NPO     SLP Rec. for Method of Medication Administration: with pudding or applesauce     Monitor for Signs of Aspiration: yes, notify SLP if any concerns  Recommended Diagnostics: reassess via clinical swallow evaluation  Swallow Criteria for Skilled Therapeutic Interventions Met:  demonstrates skilled criteria  Anticipated Discharge Disposition (SLP): inpatient rehabilitation facility  Rehab Potential/Prognosis, Swallowing: good, to achieve stated therapy goals  Therapy Frequency (Swallow): other (see comments) (Pending reevaluation)  Predicted Duration Therapy Intervention (Days): until discharge          Plan for Continued Treatment (SLP): SLP to treat communication impairment.              Plan of Care Reviewed With: patient, son  Outcome Summary: Evaluated swallowing and speech/language function.  Pt with impaired swallowing with the recommendation for NPO except meds with applesauce pending reevaluation tomorrow.  Pt's comprehension appears intact, however she exhibits apraxia of speech with dysarthria and aphasia component.  SLP to follow up tomorrow for a swallow reevaluation and ST treatment.         SWALLOW EVALUATION (last 72 hours)      SLP Adult Swallow Evaluation     Row Name 09/12/21 1400                   General Information    Prior Level of Function-Communication  WFL  -ML        Prior Level of Function-Swallowing  no diet consistency restrictions  -ML           Oral Motor Structure and Function    Secretion Management  WNL/WFL  -ML        Volitional Swallow  WFL  -ML        Volitional Cough  weak  -ML           General Eating/Swallowing Observations    Respiratory Support Currently in Use  nasal cannula  -ML        Eating/Swallowing Skills  fed by SLP  -ML        Positioning During Eating  -- Restricted position due to thrombectomy  -ML        Utensils Used  spoon  -ML        Consistencies Trialed  ice chips;nectar/syrup-thick liquids;honey-thick liquids;pureed  -ML        Pre SpO2 (%)  100  -ML        Post SpO2 (%)  100  -ML           Clinical Swallow Eval    Oral Prep Phase  impaired  -ML        Oral Transit  WFL  -ML        Oral Residue  impaired  -ML        Pharyngeal Phase  suspected pharyngeal impairment  -ML        Esophageal Phase  unremarkable  -ML        Clinical  Swallow Evaluation Summary  Clinical swallowing assessment completed. Pt with dentures in place which were not removed prior to thrombectomy. Dentures/oral cavity with oatmeal residue from breakfast pt ate at home.  Pt is unable to clear oral cavity of oatmeal residue despite awareness.  Oral care completed including dentures removed and cleaned.  Suspect premature loss with thin liquids, however positioning at 35 degrees in bed is likely contributing.  Pharyngeal signs with ice, nectar, and honey. No overt signs with puree with limited trials.  Pharyngeal signs include a persisent cough following ice/nectar/honey trials.  SLP to reevaluate swallowing tomorrow with increased time post procedure and when pt can sit upright to 90 degrees. Instructed pt/son/RN ok for PO medications only with applesauce.      -ML           Oral Prep Concerns    Oral Prep Concerns  other (see comments) Residue from oatmeal removed/? premature loss with ice  -ML           Oral Residue Concerns    Oral Residue Concerns  diffuse residue throughout oral cavity trace oatmeal  -ML        Diffuse Residue Throughout Oral Cavity  -- Breakfast/oatmeal  -ML        Oral Residue Concerns, Comment  Pt could unaware and could not clear/oral care completed  -ML           Pharyngeal Phase Concerns    Pharyngeal Phase Concerns  cough;multiple swallows  -ML        Multiple Swallows  thin  -ML        Cough  thin;nectar;honey  -ML           Clinical Impression    SLP Swallowing Diagnosis  oral dysphagia;pharyngeal dysphagia  -ML        Functional Impact  risk of aspiration/pneumonia;risk of dehydration  -ML        Rehab Potential/Prognosis, Swallowing  good, to achieve stated therapy goals  -ML        Swallow Criteria for Skilled Therapeutic Interventions Met  demonstrates skilled criteria  -ML           Recommendations    Therapy Frequency (Swallow)  other (see comments) Pending reevaluation  -ML        SLP Diet Recommendation  NPO  -ML        Recommended  Diagnostics  reassess via clinical swallow evaluation  -ML        Oral Care Recommendations  Suction toothbrush;Oral Care BID/PRN  -ML        SLP Rec. for Method of Medication Administration  with pudding or applesauce  -ML        Monitor for Signs of Aspiration  yes;notify SLP if any concerns  -ML          User Key  (r) = Recorded By, (t) = Taken By, (c) = Cosigned By    Initials Name Effective Dates    ML Silva Hooks CCC-SLP 06/16/21 -           EDUCATION  The patient has been educated in the following areas:   Dysphagia (Swallowing Impairment).       SLP GOALS     Row Name 09/12/21 1400             Word Retrieval Skills Goal 1 (SLP)    Improve Word Retrieval Skills By Goal 1 (SLP)  completing automatic speech task, counting;completing automatic speech task, alphabet;completing automatic speech task, days of the week;completing automatic speech task, months;confrontational naming task;repeating words;repeating phrases;70%;with moderate cues (50-74%)  -ML      Time Frame (Word Retrieval Goal 1, SLP)  short term goal (STG);by discharge  -ML      Barriers (Word Retrieval Goal 1, SLP)  Apraxia  -ML         Graphic Expression of Shapes, Letters, Numbers Goal (SLP)    Improve Graphic Expression of Shapes, Letters, and Numbers Goal (SLP)  Probe writing when condition warrants  -ML      Time Frame (Graphic Expression of Shapes, Letters, and Numbers Goal, SLP)  by discharge  -ML         Articulation Goal 1 (SLP)    Improve Articulation Goal 1 (SLP)  by over-articulating at word level;90%;with moderate cues (50-74%)  -ML      Time Frame (Articulation Goal 1, SLP)  short term goal (STG);by discharge  -ML         Planning and Execution of Connected Speech Goal 1 (SLP)    Improve Planning and Execution of Connected Speech by Goal 1 (SLP)  imitating one syllable words;imitating two syllable words;completing open-ended sentences;repeating phrases;90%;with moderate cues (50-74%)  -ML      Time Frame (Planning and  Execution of Connected Speech Goal 1, SLP)  short term goal (STG);by discharge  -ML         Additional Goal (SLP)    Additional Goal, SLP  LTG: Improve verbal communication  -ML      Time Frame (Additional Goal, SLP)  by discharge  -ML        User Key  (r) = Recorded By, (t) = Taken By, (c) = Cosigned By    Initials Name Provider Type    Silva Arellano CCC-SLP Speech and Language Pathologist             Time Calculation:   Time Calculation- SLP     Row Name 09/12/21 1605 09/12/21 1604          Time Calculation- SLP    SLP Start Time  1400  -ML  1400  -ML     SLP Received On  09/12/21  -ML  09/12/21  -ML        Untimed Charges    SLP Eval/Re-eval   ST Eval Speech and Production w/ Language - 91822;ST Eval Oral Pharyng Swallow - 13299  -ML  --     43042-ND Eval Speech and Production w/ Language Minutes  60  -ML  --     78408-CQ Eval Oral Pharyng Swallow Minutes  60  -ML  --        Total Minutes    Untimed Charges Total Minutes  120  -ML  --      Total Minutes  120  -ML  --       User Key  (r) = Recorded By, (t) = Taken By, (c) = Cosigned By    Initials Name Provider Type    Silva Arellano CCC-SLP Speech and Language Pathologist          Therapy Charges for Today     Code Description Service Date Service Provider Modifiers Qty    [id number] HC ST EVAL ORAL PHARYNG SWALLOW 4 9/12/2021 Silva Hooks CCC-SLP GN 1    [id number] HC ST EVAL SPEECH AND PROD W LANG  4 9/12/2021 Silva Hooks CCC-SLP GN 1        Patient was not wearing a face mask and did exhibit coughing during this therapy encounter.  Procedure performed was aerosolizing, involved close contact (within 6 feet for at least 15 minutes or longer), and did not involve contact with infectious secretions or specimens.  Therapist used appropriate personal protective equipment including gloves, standard procedure mask and eye protection.  Appropriate PPE was worn during the entire therapy session.  Hand hygiene was completed  before and after therapy session.          Silva Hooks CCC-SLP  9/12/2021

## 2021-09-12 NOTE — PLAN OF CARE
Neurointerventional Metrics    Last Known Well:  2200 on 9/11/21    [facility] Arrival (ED/transfer):  1143    Code Stroke Initiated (Inpatient):  n/a    Initial NIHSS:  10    Baseline MRS:  0    IV tPA:  No    CT Head:  1143    CT Perfusion:  1153    Arrival to Cath Lab:  1217    Groin Access: 1232    Initial Thrombectomy/Intervention (stent retriever deployment/aspiration/IA tPA):  1242    Reperfusion:  1245    Final Angiogram:  1247    End of Procedure:  1249    Pre-Procedure TICI flow:  2A    Post-Procedure TICI flow:  3    Emboli to [place] Vascular [place]:  No

## 2021-09-12 NOTE — PLAN OF CARE
Goal Outcome Evaluation:  Plan of Care Reviewed With: patient, son           Outcome Summary: Evaluated swallowing and speech/language function.  Pt with impaired swallowing with the recommendation for NPO except meds with applesauce pending reevaluation tomorrow.  Pt's comprehension appears intact, however she exhibits apraxia of speech with dysarthria and aphasia component.  SLP to follow up tomorrow for a swallow reevaluation and ST treatment.

## 2021-09-12 NOTE — BRIEF OP NOTE
"IR MECHANICAL THROMBECTOMY - PRIMARY  Progress Note    Sissy Ballard  9/12/2021    Pre-op Diagnosis:   Acute CVA, left MCA thromboembolic occlusion       Post-Op Diagnosis Codes:  Acute CVA, left MCA thromboembolic occlusion    Procedure/CPT® Codes:        Procedure(s):  IR MECHANICAL THROMBECTOMY - PRIMARY    Surgeon(s):  Vaughn Dean MD    Anesthesia: * No anesthesia type entered *    Staff:   Scrub Person: Ramos Garza  Documenter: Radha Saunders  Invasive Nurse: Nolvia Wilson RN         Estimated Blood Loss: minimal    Urine Voided: * No values recorded between 9/12/2021 12:17 PM and 9/12/2021 12:57 PM *    Specimens:                None          Drains: * No LDAs found *    Findings: There is abrupt occlusion of the superior division, M2 branch of the left MCA bifurcation, representing TICI 2A flow to the entirety of the left MCA vascular territory.  This responded well to mechanical thrombectomy ([facility]) with successful extraction of a thrombus and restoration of normal (TICI 3) flow to the left cerebral hemisphere.  There was no carotid \"culprit\" lesion, and the findings are consistent with a cardiac thromboembolic source.    Complications: None apparent.          Vaughn Dean MD     Date: 9/12/2021  Time: 13:12 EDT      "

## 2021-09-12 NOTE — ED PROVIDER NOTES
" EMERGENCY DEPARTMENT ENCOUNTER    Pt Name: Sissy Ballard  MRN: [id number]  Pt :   1935  Room Number:    Date of encounter:  2021  PCP: Pairs Gibbs APRN  ED Provider: Froylan Yarbrough MD    Historian: Patient to a very limited extent due to her inability to speak.  Paramedics.  Son.      HPI:  Chief Complaint: Right-sided weakness and inability to speak        Context: Sissy Ballard is a 85 y.o. female who presents to the ED c/o right-sided weakness and inability to speak which occurred sometime between \"late last night\" and 10 AM this morning.  The patient apparently was able to make her oatmeal which she normally eats around 10 AM.  She did not eat it.  She was found with significant right hand weakness and difficulty with speech.  She does take aspirin and Eliquis.  She is unable to communicate to tell us about any other problems.  She does shake her head no when asked if she has pain.      PAST MEDICAL HISTORY  No past medical history on file.      PAST SURGICAL HISTORY  No past surgical history on file.      FAMILY HISTORY  No family history on file.      SOCIAL HISTORY  Social History     Socioeconomic History   • Marital status:      Spouse name: Not on file   • Number of children: Not on file   • Years of education: Not on file   • Highest education level: Not on file         ALLERGIES  Patient has no allergy information on record.        REVIEW OF SYSTEMS  Review of Systems       Unable secondary to speech abnormality      PHYSICAL EXAM    I have reviewed the triage vital signs and nursing notes.    ED Triage Vitals   Temp Pulse Resp BP SpO2   -- -- -- -- --      Temp src Heart Rate Source Patient Position BP Location FiO2 (%)   -- -- -- -- --       Physical Exam  GENERAL:   Appears anxious with some right visual neglect  HENT: Nares patent.  Dry mucous membranes  EYES: No scleral icterus.  CV: Regular rhythm, regular rate.  No audible carotid bruits.  No murmurs gallops " rubs.  RESPIRATORY: Normal effort.  No audible wheezes, rales or rhonchi.  Clear to auscultation  ABDOMEN: Soft, nontender  MUSCULOSKELETAL: No deformities.   NEURO: Marked right facial droop.  Aphasic.  Decreased grasp right hand.  Alert, moves all extremities, follows commands.  See stroke navigator note for detailed NIH.  SKIN: Warm, dry, no rash visualized.        LAB RESULTS  No results found for this or any previous visit (from the past 24 hour(s)).    If labs were ordered, I independently reviewed the results.        RADIOLOGY  No Radiology Exams Resulted Within Past 24 Hours    I ordered and reviewed the above noted radiographic studies.      I viewed images of CT head which showed no bleed per my independent interpretation.    See radiologist's dictation for official interpretation.        PROCEDURES    Critical Care  Performed by: Froylan Yarbrough MD  Authorized by: Froylan Yarbrough MD     Critical care provider statement:     Critical care time (minutes):  35    Critical care time was exclusive of:  Separately billable procedures and treating other patients    Critical care was necessary to treat or prevent imminent or life-threatening deterioration of the following conditions:  CNS failure or compromise    Critical care was time spent personally by me on the following activities:  Ordering and performing treatments and interventions, ordering and review of laboratory studies, ordering and review of radiographic studies, pulse oximetry, re-evaluation of patient's condition, review of old charts, obtaining history from patient or surrogate, examination of patient, evaluation of patient's response to treatment, discussions with consultants and development of treatment plan with patient or surrogate  Comments:      I accompanied the patient to the CT scanner under stroke protocol.  Evaluate the patient for IV TPA but she does not meet criteria.  We evaluated for large vessel occlusion and find evidence of an  M2 occlusion.  Patient is being sent to the catheterization lab for clot extraction attempt.        ECG 12 Lead   Preliminary Result   Test Reason : rhythm change   Blood Pressure :   */*   mmHG   Vent. Rate :  84 BPM     Atrial Rate :  84 BPM      P-R Int : 198 ms          QRS Dur : 142 ms       QT Int : 442 ms       P-R-T Axes :  63 -47 101 degrees      QTc Int : 522 ms      Normal sinus rhythm   Left axis deviation   Left bundle branch block   Abnormal ECG   When compared with ECG of 12-SEP-2021 12:05,   No significant change was found      Referred By:            Confirmed By:       ECG 12 Lead   Final Result   Test Reason : Acute Stroke Protocol (onset < 12 hrs)   Blood Pressure :   */*   mmHG   Vent. Rate :  83 BPM     Atrial Rate :  83 BPM      P-R Int : 184 ms          QRS Dur : 154 ms       QT Int : 446 ms       P-R-T Axes :  35 -39 122 degrees      QTc Int : 524 ms      Normal sinus rhythm   Left axis deviation   Left bundle branch block   Abnormal ECG   No previous ECGs available   Confirmed by SAGE WALLIS MD ([id number]) on 9/12/2021 2:11:17 PM      Referred By: KADI           Confirmed By: SAGE WALLIS MD          MEDICATIONS GIVEN IN ER    Medications   sodium chloride 0.9 % flush 10 mL (has no administration in time range)         PROGRESS, DATA ANALYSIS, CONSULTS, AND MEDICAL DECISION MAKING    All labs have been independently reviewed by me.  All radiology studies have been reviewed by me and the radiologist dictating the report.   EKG's have been independently viewed and interpreted by me.      Differential diagnoses: This appears to be an acute CVA with uncertain time of onset.        ED Course as of Sep 12 2022   Sun Sep 12, 2021   1148 This appears to be an acute CVA with uncertain time of onset.  Regardless the patient is not a candidate for IV TPA given her Eliquis use.  We are currently imaging to evaluate for the possibility of large vessel occlusion and Cath Lab intervention.    [MS]   1208  Patient has an M2 occlusion and will be sent to the catheterization lab.    [MS]   1206 I paged Dr. Gerhardt Stein, intensivist.  I will request admission from her after the patient comes out of the catheterization lab.    [MS]      ED Course User Index  [MS] Froylan Yarbrough MD             AS OF 11:44 EDT VITALS:    BP -    HR -    TEMP -    O2 SATS -          DIAGNOSIS  Final diagnoses:   Acute CVA (cerebrovascular accident) (CMS/[facility])         DISPOSITION  To Cath Lab.           Froylan Yarbrough MD  09/12/21 2023

## 2021-09-12 NOTE — CONSULTS
Stroke Consult Note    Patient Name: Sissy Ballard   MRN: [id number]  Age: 85 y.o.  Sex: female  : 1935    Primary Care Physician: Paris Gibbs APRN  Referring Physician:  No ref. provider found    TIME STROKE TEAM CALLED: 1142 EST     TIME PATIENT SEEN: 1142 EST    Handedness: Right  Race:      Chief Complaint/Reason for Consultation: aphasia and right facial droop    Subjective .  HPI: Mrs. Ballard is an 85-year-old right-handed  female with known medical diagnosis of atrial fibrillation (on chronic Eliquis) who presents to the ED via EMS after family found her unable to speak this morning.  Other medical history is unknown at this time secondary to patient's inability to speak.  Per EMS family states that the patient was in her normal state of health last evening prior to going to bed around 2200.  She awoke this morning and was sitting at the table eating breakfast when her son came to check on her around 1000 and found her unable to speak at this time.  He also noticed that she had a right facial droop prompting him to call EMS who brought her to our facility for further evaluation.     The patient is able to respond appropriately to questions by noding and confirms that she has been compliant with her ASA and Eliquis.    Last Known Normal Date/Time: 2021 2200 EST     Review of Systems   Unable to perform ROS: Patient nonverbal      No past medical history on file.  No past surgical history on file.  No family history on file.  Social History     Socioeconomic History   • Marital status:      Spouse name: Not on file   • Number of children: Not on file   • Years of education: Not on file   • Highest education level: Not on file     No Known Allergies  Prior to Admission medications    Not on File         Objective     Temp:  [97.7 °F (36.5 °C)] 97.7 °F (36.5 °C)  Heart Rate:  [81-84] 84  Resp:  [16] 16  BP: (121-142)/(61-81) 124/61  Neurological Exam  Mental  Status  Awake and alert. Oriented only to person. Orientation: Unable to fully assess secondary to aphasia. Patient is nonverbal. Expressive aphasia present. Attention and concentration are normal.    Cranial Nerves  CN II: Visual fields full to confrontation.  CN III, IV, VI: Extraocular movements intact bilaterally. Pupils equal round and reactive to light bilaterally.  CN V: Facial sensation is normal.  CN VII:  Right: There is central facial weakness.  Left: There is no facial weakness.  CN VIII: Hearing intact bilaterally.  CN IX, X: Palate elevates symmetrically  CN XI: Shoulder shrug strength is normal.  CN XII: Tongue midline without atrophy or fasciculations.    Motor  Normal muscle bulk throughout. No fasciculations present. Normal muscle tone. Strength is 5/5 in all four extremities except as noted.  Mild drift present in RLE, strength 5/5 throughout.    Sensory  Light touch is normal in upper and lower extremities.     Coordination  Finger-to-nose, rapid alternating movements and heel-to-shin normal bilaterally without dysmetria.    Gait  Unable to assess.      Physical Exam  Vitals and nursing note reviewed.   Constitutional:       General: She is awake. She is not in acute distress.     Appearance: Normal appearance. She is not toxic-appearing.   HENT:      Head: Normocephalic and atraumatic.      Mouth/Throat:      Mouth: Mucous membranes are dry.      Comments: Patient still has pieces of oatmeal in mouth  Eyes:      Extraocular Movements: Extraocular movements intact.      Pupils: Pupils are equal, round, and reactive to light.   Cardiovascular:      Rate and Rhythm: Normal rate and regular rhythm.   Pulmonary:      Effort: Pulmonary effort is normal.   Skin:     General: Skin is warm and dry.   Neurological:      Mental Status: She is alert and oriented to person, place, and time.      Cranial Nerves: Cranial nerve deficit present.      Sensory: No sensory deficit.      Motor: Weakness present.       Coordination: Coordination is intact. Coordination normal.   Psychiatric:         Behavior: Behavior normal.       Acute Stroke Data    Alteplase (tPA) Inclusion / Exclusion Criteria    Time: 11:46 EDT  Person Administering Scale: TAYLOR Gauthier    Inclusion Criteria  [x]   18 years of age or greater   []   Onset of symptoms < 4.5 hours before beginning treatment (stroke onset = time patient was last seen well or without symptoms).   [x]   Diagnosis of acute ischemic stroke causing measurable disabling deficit (Complete Hemianopia, Any Aphasia, Visual or Sensory Extinction, Any weakness limiting sustained effort against gravity)   []   Any remaining deficit considered potentially disabling in view of patient and practitioner   Exclusion criteria (Do not proceed with Alteplase if any are checked under exclusion criteria)  [x]   Onset unknown or GREATER than 4.5 hours   []   ICH on CT/MRI   []   CT demonstrates hypodensity representing acute or subacute infarct   []   Significant head trauma or prior stroke in the previous 3 months   []   Symptoms suggestive of subarachnoid hemorrhage   []   History of un-ruptured intracranial aneurysm GREATER than 10 mm   []   Recent intracranial or intraspinal surgery within the last 3 months   []   Arterial puncture at a non-compressible site in the previous 7 days   []   Active internal bleeding   []   Acute bleeding tendency   []   Platelet count LESS than 100,000 for known hematological diseases such as leukemia, thrombocytopenia or chronic cirrhosis   []   Current use of anticoagulant with INR GREATER than 1.7 or PT GREATER than 15 seconds, aPTT GREATER than 40 seconds   []   Heparin received within 48 hours, resulting in abnormally elevated aPTT GREATER than upper limit of normal   [x]   Current use of direct thrombin inhibitors or direct factor Xa inhibitors in the past 48 hours   []   Elevated blood pressure refractory to treatment (systolic GREATER than 185  mm/Hg or diastolic  GREATER than 110 mm/Hg   []   Suspected infective endocarditis and aortic arch dissection   []   Current use of therapeutic treatment dose of low-molecular-weight heparin (LMWH) within the previous 24 hours   []   Structural GI malignancy or bleed   Relative exclusion for all patients  []   Only minor non-disabling symptoms   []   Pregnancy   []   Seizure at onset with postictal residual neurological impairments   []   Major surgery or previous trauma within past 14 days   []   History of previous spontaneous ICH, intracranial neoplasm, or AV malformation   []   Postpartum (within previous 14 days)   []   Recent GI or urinary tract hemorrhage (within previous 21 days)   []   Recent acute MI (within previous 3 months)   []   History of un-ruptured intracranial aneurysm LESS than 10 mm   []   History of ruptured intracranial aneurysm   []   Blood glucose LESS than 50 mg/dL (2.7 mmol/L)   []   Dural puncture within the last 7 days   []   Known GREATER than 10 cerebral microbleeds   Additional exclusions for patients with symptoms onset between 3 and 4.5 hours.  []   Age > 80.   []   On any anticoagulants regardless of INR  >>> Warfarin (Coumadin), Heparin, Enoxaparin (Lovenox), fondaparinux (Arixtra), bivalirudin (Angiomax), Argatroban, dabigatran (Pradaxa), rivaroxaban (Xarelto), or apixaban (Eliquis)   []   Severe stroke (NIHSS > 25).   []   History of BOTH diabetes and previous ischemic stroke.   []   The risks and benefits have been discussed with the patient or family related to the administration of IV Alteplase for stroke symptoms.   []   I have discussed and reviewed the patient's case and imaging with the attending prior to IV Alteplase.   N/A Time Alteplase administered       Hospital Meds:  Scheduled-    Infusions-     PRNs- •  sodium chloride    Functional Status Prior to Current Stroke/[place] Score: 0; patient lives with her son however is independent of all of her ADLs.  She  occasionally uses a cane to help with stability when walking.    NIH Stroke Scale  Time: 11:46 EDT  Person Administering Scale: TAYLOR Gauthier    Interval: baseline  1a. Level of Consciousness: 0-->Alert, keenly responsive  1b. LOC Questions: 2-->Answers neither question correctly  1c. LOC Commands: 0-->Performs both tasks correctly  2. Best Gaze: 0-->Normal  3. Visual: 0-->No visual loss  4. Facial Palsy: 2-->Partial paralysis (total or near-total paralysis of lower face)  5a. Motor Arm, Left: 0-->No drift, limb holds 90 (or 45) degrees for full 10 secs  5b. Motor Arm, Right: 0-->No drift, limb holds 90 (or 45) degrees for full 10 secs  6a. Motor Leg, Left: 0-->No drift, leg holds 30 degree position for full 5 secs  6b. Motor Leg, Right: 1-->Drift, leg falls by the end of the 5-sec period but does not hit bed  7. Limb Ataxia: 0-->Absent  8. Sensory: 0-->Normal, no sensory loss  9. Best Language: 3-->Mute, global aphasia, no usable speech or auditory comprehension  10. Dysarthria: 2-->Severe dysarthria, patients speech is so slurred as to be unintelligible in the absence of or out of proportion to any dysphasia, or is mute/anarthric  11. Extinction and Inattention (formerly Neglect): 0-->No abnormality    Total (NIH Stroke Scale): 10      Results Reviewed:  I have personally reviewed current lab, radiology, and data and agree with results.    CT head without contrast is negative for hemorrhage and/or acute process.    CTA head/neck reveals a left M2 occlusion.    CT perfusion reveals M2 occlusion, small core infarct with moderate amount of ischemic penumbra.          Assessment/Plan:    This is an 85-year-old right-handed  female with known medical diagnosis of atrial fibrillation (on chronic Eliquis) who presented to the emergency department today for further evaluation of right-sided weakness, facial droop, and expressive aphasia noted by family at approximately 1000 today.  CT of the head  was negative for hemorrhage and/or acute process.  CTA of the head/neck and CT perfusion scan were performed which revealed a left M2 occlusion, small amount of core with large amount of ischemic penumbra.  This was reviewed by Dr. Dean who elected to take her to the Cath Lab and performed a mechanical thrombectomy.      1. Acute right M2 ischemic stroke, etiology cardioembolic  -TIA/CVA order set without thrombolytic therapy has been initiated  -Post radiology order set has been initiated  -MRI of the brain without contrast later this evening  -Dual-energy CT scan in a.m.  -SBP   -2D echocardiogram  -A1c and lipid panel  -Bedrest for tonight  -N.p.o. until SLP evaluation  -ASA 81 mg oral or 300 mg rectal today and daily  -We will hold anticoagulation until tomorrow's repeat CT scan    2. Chronic atrial fibrillation  -Patient on Eliquis prior to arrival, and reports medication compliance  -Hold anticoagulation until tomorrow's repeat CT scan  -Patient will need to be switched to Xarelto given her Eliquis failure  -Rate control per intensivist    Discussed plan of care with the patient, her son, Dr. Yarbrough, Dr. Cherry, Dr. Dean, and AYE VAZQUEZ for the intensivist service.  Stroke neurology will continue to follow.    TAYLOR Gauthier  September 12, 2021  13:09 EDT

## 2021-09-13 ENCOUNTER — ANCILLARY PROCEDURE (OUTPATIENT)
Dept: SPEECH THERAPY | Facility: HOSPITAL | Age: 86
End: 2021-09-13

## 2021-09-13 ENCOUNTER — APPOINTMENT (OUTPATIENT)
Dept: CT IMAGING | Facility: HOSPITAL | Age: 86
End: 2021-09-13

## 2021-09-13 LAB
ANION GAP SERPL CALCULATED.3IONS-SCNC: 14 MMOL/L (ref 5–15)
BACTERIA UR QL AUTO: ABNORMAL /HPF
BH CV ECHO MEAS - AO MAX PG (FULL): 6.9 MMHG
BH CV ECHO MEAS - AO MAX PG: 8.5 MMHG
BH CV ECHO MEAS - AO MEAN PG (FULL): 3.8 MMHG
BH CV ECHO MEAS - AO MEAN PG: 4.7 MMHG
BH CV ECHO MEAS - AO ROOT AREA: 6.6 CM^2
BH CV ECHO MEAS - AO ROOT DIAM: 2.9 CM
BH CV ECHO MEAS - AO V2 MAX: 146 CM/SEC
BH CV ECHO MEAS - AO V2 MEAN: 101.5 CM/SEC
BH CV ECHO MEAS - AO V2 VTI: 28.9 CM
BH CV ECHO MEAS - AVA(I,A): 1.3 CM^2
BH CV ECHO MEAS - AVA(I,D): 1.7 CM^2
BH CV ECHO MEAS - AVA(V,A): 1.4 CM^2
BH CV ECHO MEAS - AVA(V,D): 1.4 CM^2
BH CV ECHO MEAS - EDV(CUBED): 116.4 ML
BH CV ECHO MEAS - EDV(MOD-SP2): 186 ML
BH CV ECHO MEAS - EDV(MOD-SP4): 171 ML
BH CV ECHO MEAS - EDV(TEICH): 111.9 ML
BH CV ECHO MEAS - EF(CUBED): 51.5 %
BH CV ECHO MEAS - EF(MOD-BP): 40 %
BH CV ECHO MEAS - EF(MOD-SP2): 40.9 %
BH CV ECHO MEAS - EF(MOD-SP4): 40.9 %
BH CV ECHO MEAS - EF(TEICH): 43.3 %
BH CV ECHO MEAS - ESV(CUBED): 56.5 ML
BH CV ECHO MEAS - ESV(MOD-SP2): 110 ML
BH CV ECHO MEAS - ESV(MOD-SP4): 101 ML
BH CV ECHO MEAS - ESV(TEICH): 63.4 ML
BH CV ECHO MEAS - FS: 21.4 %
BH CV ECHO MEAS - IVS/LVPW: 0.82
BH CV ECHO MEAS - IVSD: 0.9 CM
BH CV ECHO MEAS - LA DIMENSION: 3 CM
BH CV ECHO MEAS - LA/AO: 1
BH CV ECHO MEAS - LAD MAJOR: 3.9 CM
BH CV ECHO MEAS - LAT PEAK E' VEL: 6.3 CM/SEC
BH CV ECHO MEAS - LATERAL E/E' RATIO: 15.1
BH CV ECHO MEAS - LV MASS(C)D: 186.1 GRAMS
BH CV ECHO MEAS - LV MAX PG: 1.7 MMHG
BH CV ECHO MEAS - LV MEAN PG: 0.89 MMHG
BH CV ECHO MEAS - LV V1 MAX: 64.4 CM/SEC
BH CV ECHO MEAS - LV V1 MEAN: 43.4 CM/SEC
BH CV ECHO MEAS - LV V1 VTI: 11.5 CM
BH CV ECHO MEAS - LVIDD: 4.9 CM
BH CV ECHO MEAS - LVIDS: 3.8 CM
BH CV ECHO MEAS - LVLD AP2: 8.7 CM
BH CV ECHO MEAS - LVLD AP4: 8.1 CM
BH CV ECHO MEAS - LVLS AP2: 8.2 CM
BH CV ECHO MEAS - LVLS AP4: 7.4 CM
BH CV ECHO MEAS - LVOT AREA (M): 3.1 CM^2
BH CV ECHO MEAS - LVOT AREA: 3.2 CM^2
BH CV ECHO MEAS - LVOT DIAM: 2 CM
BH CV ECHO MEAS - LVPWD: 1.1 CM
BH CV ECHO MEAS - MED PEAK E' VEL: 4.2 CM/SEC
BH CV ECHO MEAS - MEDIAL E/E' RATIO: 22.4
BH CV ECHO MEAS - MV DEC TIME: 0.12 SEC
BH CV ECHO MEAS - MV E MAX VEL: 96.3 CM/SEC
BH CV ECHO MEAS - MV MAX PG: 6.7 MMHG
BH CV ECHO MEAS - MV MEAN PG: 1.6 MMHG
BH CV ECHO MEAS - MV V2 MAX: 129 CM/SEC
BH CV ECHO MEAS - MV V2 MEAN: 52.9 CM/SEC
BH CV ECHO MEAS - MV V2 VTI: 33.7 CM
BH CV ECHO MEAS - MVA(VTI): 1.1 CM^2
BH CV ECHO MEAS - PA ACC SLOPE: 975.2 CM/SEC^2
BH CV ECHO MEAS - PA ACC TIME: 0.08 SEC
BH CV ECHO MEAS - PA MAX PG: 6.8 MMHG
BH CV ECHO MEAS - PA PR(ACCEL): 42.6 MMHG
BH CV ECHO MEAS - PA V2 MAX: 130.3 CM/SEC
BH CV ECHO MEAS - RAP SYSTOLE: 3 MMHG
BH CV ECHO MEAS - RVSP: 24 MMHG
BH CV ECHO MEAS - SV(AO): 191.4 ML
BH CV ECHO MEAS - SV(CUBED): 59.9 ML
BH CV ECHO MEAS - SV(LVOT): 37 ML
BH CV ECHO MEAS - SV(MOD-SP2): 76 ML
BH CV ECHO MEAS - SV(MOD-SP4): 70 ML
BH CV ECHO MEAS - SV(TEICH): 48.5 ML
BH CV ECHO MEAS - TAPSE (>1.6): 2 CM
BH CV ECHO MEAS - TR MAX PG: 21 MMHG
BH CV ECHO MEAS - TR MAX VEL: 228 CM/SEC
BH CV ECHO MEASUREMENTS AVERAGE E/E' RATIO: 18.34
BH CV XLRA - RV BASE: 3.7 CM
BH CV XLRA - RV LENGTH: 6.3 CM
BH CV XLRA - RV MID: 1.9 CM
BH CV XLRA - TDI S': 12.2 CM/SEC
BILIRUB UR QL STRIP: NEGATIVE
BUN SERPL-MCNC: 31 MG/DL (ref 8–23)
BUN/CREAT SERPL: 22.5 (ref 7–25)
CALCIUM SPEC-SCNC: 9.7 MG/DL (ref 8.6–10.5)
CHLORIDE SERPL-SCNC: 101 MMOL/L (ref 98–107)
CHOLEST SERPL-MCNC: 262 MG/DL (ref 0–200)
CLARITY UR: CLEAR
CO2 SERPL-SCNC: 25 MMOL/L (ref 22–29)
COLOR UR: YELLOW
CREAT SERPL-MCNC: 1.38 MG/DL (ref 0.57–1)
GFR SERPL CREATININE-BSD FRML MDRD: 37 ML/MIN/1.73
GLUCOSE BLDC GLUCOMTR-MCNC: 132 MG/DL (ref 70–130)
GLUCOSE BLDC GLUCOMTR-MCNC: 196 MG/DL (ref 70–130)
GLUCOSE BLDC GLUCOMTR-MCNC: 301 MG/DL (ref 70–130)
GLUCOSE BLDC GLUCOMTR-MCNC: 374 MG/DL (ref 70–130)
GLUCOSE SERPL-MCNC: 75 MG/DL (ref 65–99)
GLUCOSE UR STRIP-MCNC: NEGATIVE MG/DL
HBA1C MFR BLD: 10.1 % (ref 4.8–5.6)
HDLC SERPL-MCNC: 51 MG/DL (ref 40–60)
HGB UR QL STRIP.AUTO: NEGATIVE
HYALINE CASTS UR QL AUTO: ABNORMAL /LPF
KETONES UR QL STRIP: NEGATIVE
LDLC SERPL CALC-MCNC: 170 MG/DL (ref 0–100)
LDLC/HDLC SERPL: 3.27 {RATIO}
LEFT ATRIUM VOLUME: 35 ML
LEUKOCYTE ESTERASE UR QL STRIP.AUTO: ABNORMAL
LV EF 2D ECHO EST: 40 %
MAXIMAL PREDICTED HEART RATE: 137 BPM
NITRITE UR QL STRIP: POSITIVE
PH UR STRIP.AUTO: 7.5 [PH] (ref 5–8)
POTASSIUM SERPL-SCNC: 4.3 MMOL/L (ref 3.5–5.2)
PROT UR QL STRIP: ABNORMAL
QT INTERVAL: 442 MS
QTC INTERVAL: 522 MS
RBC # UR: ABNORMAL /HPF
REF LAB TEST METHOD: ABNORMAL
SODIUM SERPL-SCNC: 140 MMOL/L (ref 136–145)
SP GR UR STRIP: 1.05 (ref 1–1.03)
SQUAMOUS #/AREA URNS HPF: ABNORMAL /HPF
STRESS TARGET HR: 116 BPM
TRIGL SERPL-MCNC: 222 MG/DL (ref 0–150)
UROBILINOGEN UR QL STRIP: ABNORMAL
VLDLC SERPL-MCNC: 41 MG/DL (ref 5–40)
WBC UR QL AUTO: ABNORMAL /HPF

## 2021-09-13 PROCEDURE — 87086 URINE CULTURE/COLONY COUNT: CPT | Performed by: INTERNAL MEDICINE

## 2021-09-13 PROCEDURE — 87186 SC STD MICRODIL/AGAR DIL: CPT | Performed by: INTERNAL MEDICINE

## 2021-09-13 PROCEDURE — 83036 HEMOGLOBIN GLYCOSYLATED A1C: CPT

## 2021-09-13 PROCEDURE — 63710000001 INSULIN LISPRO (HUMAN) PER 5 UNITS: Performed by: INTERNAL MEDICINE

## 2021-09-13 PROCEDURE — 99233 SBSQ HOSP IP/OBS HIGH 50: CPT | Performed by: INTERNAL MEDICINE

## 2021-09-13 PROCEDURE — 97161 PT EVAL LOW COMPLEX 20 MIN: CPT

## 2021-09-13 PROCEDURE — 70450 CT HEAD/BRAIN W/O DYE: CPT

## 2021-09-13 PROCEDURE — 81001 URINALYSIS AUTO W/SCOPE: CPT | Performed by: INTERNAL MEDICINE

## 2021-09-13 PROCEDURE — 92610 EVALUATE SWALLOWING FUNCTION: CPT

## 2021-09-13 PROCEDURE — 92612 ENDOSCOPY SWALLOW (FEES) VID: CPT

## 2021-09-13 PROCEDURE — 97165 OT EVAL LOW COMPLEX 30 MIN: CPT

## 2021-09-13 PROCEDURE — 25010000002 CEFTRIAXONE PER 250 MG: Performed by: INTERNAL MEDICINE

## 2021-09-13 PROCEDURE — 92507 TX SP LANG VOICE COMM INDIV: CPT

## 2021-09-13 PROCEDURE — 97535 SELF CARE MNGMENT TRAINING: CPT

## 2021-09-13 PROCEDURE — 99232 SBSQ HOSP IP/OBS MODERATE 35: CPT

## 2021-09-13 PROCEDURE — 80048 BASIC METABOLIC PNL TOTAL CA: CPT | Performed by: INTERNAL MEDICINE

## 2021-09-13 PROCEDURE — 80061 LIPID PANEL: CPT

## 2021-09-13 PROCEDURE — 82962 GLUCOSE BLOOD TEST: CPT

## 2021-09-13 PROCEDURE — 87088 URINE BACTERIA CULTURE: CPT | Performed by: INTERNAL MEDICINE

## 2021-09-13 RX ORDER — TORSEMIDE 20 MG/1
20 TABLET ORAL DAILY
COMMUNITY

## 2021-09-13 RX ORDER — ACETAMINOPHEN 325 MG/1
650 TABLET ORAL EVERY 6 HOURS PRN
Status: DISCONTINUED | OUTPATIENT
Start: 2021-09-13 | End: 2021-09-17 | Stop reason: HOSPADM

## 2021-09-13 RX ORDER — INSULIN GLARGINE 100 [IU]/ML
35 INJECTION, SOLUTION SUBCUTANEOUS DAILY
COMMUNITY

## 2021-09-13 RX ORDER — LEVOTHYROXINE SODIUM 0.05 MG/1
50 TABLET ORAL
Status: DISCONTINUED | OUTPATIENT
Start: 2021-09-14 | End: 2021-09-17 | Stop reason: HOSPADM

## 2021-09-13 RX ORDER — PANTOPRAZOLE SODIUM 40 MG/1
40 TABLET, DELAYED RELEASE ORAL
Status: DISCONTINUED | OUTPATIENT
Start: 2021-09-14 | End: 2021-09-17 | Stop reason: HOSPADM

## 2021-09-13 RX ADMIN — SODIUM CHLORIDE, PRESERVATIVE FREE 10 ML: 5 INJECTION INTRAVENOUS at 21:52

## 2021-09-13 RX ADMIN — INSULIN LISPRO 2 UNITS: 100 INJECTION, SOLUTION INTRAVENOUS; SUBCUTANEOUS at 13:13

## 2021-09-13 RX ADMIN — SODIUM CHLORIDE, PRESERVATIVE FREE 10 ML: 5 INJECTION INTRAVENOUS at 00:10

## 2021-09-13 RX ADMIN — ASPIRIN 81 MG CHEWABLE TABLET 81 MG: 81 TABLET CHEWABLE at 10:43

## 2021-09-13 RX ADMIN — METOPROLOL TARTRATE 25 MG: 25 TABLET, FILM COATED ORAL at 10:42

## 2021-09-13 RX ADMIN — ATORVASTATIN CALCIUM 80 MG: 40 TABLET, FILM COATED ORAL at 21:52

## 2021-09-13 RX ADMIN — APIXABAN 5 MG: 5 TABLET, FILM COATED ORAL at 21:52

## 2021-09-13 RX ADMIN — SODIUM CHLORIDE 1 G: 900 INJECTION INTRAVENOUS at 13:12

## 2021-09-13 RX ADMIN — ACETAMINOPHEN 650 MG: 325 TABLET, FILM COATED ORAL at 22:02

## 2021-09-13 RX ADMIN — LEVOTHYROXINE SODIUM 50 MCG: 20 INJECTION, SOLUTION INTRAVENOUS at 10:43

## 2021-09-13 RX ADMIN — INSULIN LISPRO 7 UNITS: 100 INJECTION, SOLUTION INTRAVENOUS; SUBCUTANEOUS at 18:01

## 2021-09-13 RX ADMIN — METOPROLOL TARTRATE 25 MG: 25 TABLET, FILM COATED ORAL at 21:50

## 2021-09-13 RX ADMIN — APIXABAN 5 MG: 5 TABLET, FILM COATED ORAL at 13:12

## 2021-09-13 RX ADMIN — INSULIN LISPRO 8 UNITS: 100 INJECTION, SOLUTION INTRAVENOUS; SUBCUTANEOUS at 22:08

## 2021-09-13 NOTE — PLAN OF CARE
Problem: Adult Inpatient Plan of Care  Goal: Plan of Care Review  Outcome: Ongoing, Progressing  Flowsheets (Taken 9/12/2021 2000)  Plan of Care Reviewed With: patient   Goal Outcome Evaluation:  Plan of Care Reviewed With: patient         Patient is alert and oriented to self and family. She will make eye contact and answer simple questions with one-two words. Her speech is slurred and she has word finding difficulty. PERRTL. Right facial droop and RUE weakness. RA sat 96%. NSR with 1degree AVB. -130. No edema, however lower extremities are reddened. NPO. PW in place. Afebrile. Labia reddened.

## 2021-09-13 NOTE — THERAPY EVALUATION
Patient Name: Sissy Ballard  : 1935    MRN: [id number]                              Today's Date: 2021       Admit Date: 2021    Visit Dx:     ICD-10-CM ICD-9-CM   1. Acute CVA (cerebrovascular accident) (CMS/HCC)  I63.9 434.91   2. Oropharyngeal dysphagia  R13.12 787.22   3. Cognitive communication deficit  R41.841 799.52     Patient Active Problem List   Diagnosis   • Ischemic cerebrovascular accident (CVA) (CMS/HCC)   • Essential hypertension   • Type 2 diabetes mellitus (CMS/HCC)   • Longstanding persistent atrial fibrillation (CMS/HCC)   • Stage 3b chronic kidney disease (CMS/HCC)   • Hypothyroidism (acquired)     Past Medical History:   Diagnosis Date   • Congestive heart failure (CHF) (CMS/HCC)    • Coronary artery disease     MI,  or    • Essential hypertension 2021   • Hypothyroid    • Hypothyroidism (acquired) 2021   • Nephrolithiasis    • Permanent atrial fibrillation (CMS/HCC) on Eliquis 2021   • Type 2 diabetes mellitus (CMS/HCC) 2021     Past Surgical History:   Procedure Laterality Date   • APPENDECTOMY      Performed at the time of her hysterectomy   • CARDIAC CATHETERIZATION       or  with stent she does not know details   • CATARACT EXTRACTION, BILATERAL     •  SECTION      X2   • TONSILLECTOMY     • TOTAL ABDOMINAL HYSTERECTOMY WITH SALPINGO OOPHORECTOMY      For ovarian cancer     General Information     Row Name 21 1408          Physical Therapy Time and Intention    Document Type  evaluation  -AY     Mode of Treatment  physical therapy  -AY     Row Name 21 1408          General Information    Patient Profile Reviewed  yes  -AY     Prior Level of Function  independent:;all household mobility;community mobility;gait;transfer;bed mobility;using stairs amb with RWx at baseline  -AY     Existing Precautions/Restrictions  fall;other (see comments) dysarthria  -AY     Barriers to Rehab  medically complex  -AY     Row Name 21  1408          Living Environment    Lives With  child(mayra), adult son  -AY     Row Name 09/13/21 1408          Home Main Entrance    Number of Stairs, Main Entrance  none  -AY     Row Name 09/13/21 1408          Stairs Within Home, Primary    Number of Stairs, Within Home, Primary  none  -AY     Row Name 09/13/21 1408          Cognition    Orientation Status (Cognition)  oriented x 3  -AY     Row Name 09/13/21 1408          Safety Issues, Functional Mobility    Safety Issues Affecting Function (Mobility)  insight into deficits/self-awareness;awareness of need for assistance  -AY     Impairments Affecting Function (Mobility)  balance;endurance/activity tolerance;strength;coordination  -AY       User Key  (r) = Recorded By, (t) = Taken By, (c) = Cosigned By    Initials Name Provider Type    AY Krystin Rutherford, PT Physical Therapist        Mobility     Row Name 09/13/21 1409          Bed Mobility    Comment (Bed Mobility)  received sitting EOB  -AY     Row Name 09/13/21 1409          Transfers    Comment (Transfers)  VC for hand placement and sequencing  -AY     Row Name 09/13/21 1409          Sit-Stand Transfer    Sit-Stand [place] (Transfers)  contact guard;verbal cues  -AY     Assistive Device (Sit-Stand Transfers)  walker, front-wheeled  -AY     Row Name 09/13/21 1409          Gait/Stairs (Locomotion)    [place] Level (Gait)  minimum assist (75% patient effort);1 person assist;verbal cues  -AY     Assistive Device (Gait)  walker, front-wheeled  -AY     Distance in Feet (Gait)  150  -AY     Deviations/Abnormal Patterns (Gait)  stephon decreased;gait speed decreased;bilateral deviations;stride length decreased;base of support, narrow;scissoring  -AY     Bilateral Gait Deviations  forward flexed posture;heel strike decreased  -AY     Comment (Gait/Stairs)  Pt demonstrated step through gait pattern with decreased stephon and flexed posture. Manual assist required for walker management throughout. VC for  posture, walker positioning, and increased NAYELI and stride length. Gait distance limited by fatigue.  -AY       User Key  (r) = Recorded By, (t) = Taken By, (c) = Cosigned By    Initials Name Provider Type    Krystin Padgett PT Physical Therapist        Obj/Interventions     Row Name 09/13/21 1411          Range of Motion Comprehensive    General Range of Motion  bilateral lower extremity ROM WFL  -AY     Row Name 09/13/21 1411          Strength Comprehensive (MMT)    General Manual Muscle Testing (MMT) Assessment  lower extremity strength deficits identified  -AY     Comment, General Manual Muscle Testing (MMT) Assessment  BLE grossly 4-/5 symmetrically  -AY     Row Name 09/13/21 1411          Motor Skills    Motor Skills  coordination  -AY     Coordination  gross motor deficit;bilateral;lower extremity;minimal impairment;heel to chaves  -AY     Row Name 09/13/21 1411          Balance    Balance Assessment  sitting static balance;sitting dynamic balance;standing static balance;standing dynamic balance  -AY     Static Sitting Balance  WFL;sitting, edge of bed  -AY     Dynamic Sitting Balance  sitting, edge of bed;WFL  -AY     Static Standing Balance  mild impairment;supported;standing  -AY     Dynamic Standing Balance  mild impairment;supported;standing  -AY     Row Name 09/13/21 1411          Sensory Assessment (Somatosensory)    Sensory Assessment (Somatosensory)  LE sensation intact  -AY       User Key  (r) = Recorded By, (t) = Taken By, (c) = Cosigned By    Initials Name Provider Type    Krystin Padgett PT Physical Therapist        Goals/Plan     Row Name 09/13/21 1501          Bed Mobility Goal 1 (PT)    Activity/Assistive Device (Bed Mobility Goal 1, PT)  sit to supine/supine to sit  -AY     [place] Level/Cues Needed (Bed Mobility Goal 1, PT)  independent  -AY     Time Frame (Bed Mobility Goal 1, PT)  long term goal (LTG);2 weeks  -AY     Row Name 09/13/21 1501          Transfer Goal 1 (PT)     Activity/Assistive Device (Transfer Goal 1, PT)  sit-to-stand/stand-to-sit  -AY     [place] Level/Cues Needed (Transfer Goal 1, PT)  independent  -AY     Time Frame (Transfer Goal 1, PT)  long term goal (LTG);2 weeks  -AY     Row Name 09/13/21 1501          Gait Training Goal 1 (PT)    Activity/Assistive Device (Gait Training Goal 1, PT)  gait (walking locomotion)  -AY     [place] Level (Gait Training Goal 1, PT)  independent  -AY     Distance (Gait Training Goal 1, PT)  250  -AY     Time Frame (Gait Training Goal 1, PT)  long term goal (LTG);2 weeks  -AY       User Key  (r) = Recorded By, (t) = Taken By, (c) = Cosigned By    Initials Name Provider Type    AY Krystin Rutherford, PT Physical Therapist        Clinical Impression     Row Name 09/13/21 1413          Pain    Additional Documentation  Pain Scale: Numbers Pre/Post-Treatment (Group)  -AY     Row Name 09/13/21 1413          Pain Scale: Numbers Pre/Post-Treatment    Pretreatment Pain Rating  0/10 - no pain  -AY     Posttreatment Pain Rating  0/10 - no pain  -AY     Pain Intervention(s)  Ambulation/increased activity;Repositioned  -AY     Row Name 09/13/21 1413          Plan of Care Review    Plan of Care Reviewed With  patient;son  -AY     Outcome Summary  PT initial eval completed. Pt limited by decreased functional endurance, balance deficit, generalized weakness, and dysarthria. Pt amb 150ft with RWx and min Ax1+1 with poor BLE coordination noted. Rec continued skilled IP PT to increase indep with mobility. d/c rec for HWA and HH PT/OT.  -AY     Row Name 09/13/21 1413          Therapy Assessment/Plan (PT)    Rehab Potential (PT)  good, to achieve stated therapy goals  -AY     Criteria for Skilled Interventions Met (PT)  yes;meets criteria;skilled treatment is necessary  -AY     Row Name 09/13/21 1413          Vital Signs    Pre Systolic BP Rehab  137  -AY     Pre Treatment Diastolic BP  74  -AY     Post Systolic BP Rehab  145  -AY     Post Treatment  Diastolic BP  71  -AY     Pretreatment Heart Rate (beats/min)  87  -AY     Posttreatment Heart Rate (beats/min)  93  -AY     Pre SpO2 (%)  94  -AY     O2 Delivery Pre Treatment  room air  -AY     O2 Delivery Intra Treatment  room air  -AY     Post SpO2 (%)  96  -AY     O2 Delivery Post Treatment  room air  -AY     Pre Patient Position  Sitting  -AY     Intra Patient Position  Standing  -AY     Post Patient Position  Sitting  -AY     Row Name 09/13/21 1413          Positioning and Restraints    Pre-Treatment Position  in bed  -AY     Post Treatment Position  chair  -AY     In Chair  notified nsg;reclined;sitting;call light within reach;encouraged to call for assist;exit alarm on;waffle cushion;legs elevated;with family/caregiver  -AY       User Key  (r) = Recorded By, (t) = Taken By, (c) = Cosigned By    Initials Name Provider Type    Krystin Padgett, PT Physical Therapist        Outcome Measures     Row Name 09/13/21 1502          How much help from another person do you currently need...    Turning from your back to your side while in flat bed without using bedrails?  4  -AY     Moving from lying on back to sitting on the side of a flat bed without bedrails?  3  -AY     Moving to and from a bed to a chair (including a wheelchair)?  3  -AY     Standing up from a chair using your arms (e.g., wheelchair, bedside chair)?  3  -AY     Climbing 3-5 steps with a railing?  2  -AY     To walk in hospital room?  3  -AY     AM-PAC 6 Clicks Score (PT)  18  -AY     Row Name 09/13/21 1502 09/13/21 1431       Modified [place] Scale    Pre-Stroke Modified [place] Scale  6 - Unable to determine (UTD) from the medical record documentation  -AY  6 - Unable to determine (UTD) from the medical record documentation  -MA    Modified [place] Scale  3 - Moderate disability.  Requiring some help, but able to walk without assistance.  -AY  4 - Moderately severe disability.  Unable to walk without assistance, and unable to attend to own bodily  needs without assistance.  -MA    Row Name 09/13/21 1502 09/13/21 1431       Functional Assessment    Outcome Measure Options  AM-PAC 6 Clicks Basic Mobility (PT);Modified [place]  -REYNA  AM-PAC 6 Clicks Daily Activity (OT);Modified [place]  -MA      User Key  (r) = Recorded By, (t) = Taken By, (c) = Cosigned By    Initials Name Provider Type    AY Krystin Rutherford, PT Physical Therapist    Brenda Edwards, OT Occupational Therapist                       Physical Therapy Education                 Title: PT OT SLP Therapies (In Progress)     Topic: Physical Therapy (In Progress)     Point: Mobility training (Done)     Learning Progress Summary           Patient Acceptance, E,TB, VU,NR by AY at 9/13/2021 1504                   Point: Home exercise program (Not Started)     Learner Progress:  Not documented in this visit.          Point: Body mechanics (Done)     Learning Progress Summary           Patient Acceptance, E,TB, VU,NR by AY at 9/13/2021 1504                   Point: Precautions (Done)     Learning Progress Summary           Patient Acceptance, E,TB, VU,NR by AY at 9/13/2021 1504                               User Key     Initials Effective Dates Name Provider Type Discipline     11/10/20 -  Krystin Rutherford, PT Physical Therapist PT              PT Recommendation and Plan  Planned Therapy Interventions (PT): balance training, bed mobility training, gait training, home exercise program, patient/family education, strengthening, transfer training, postural re-education  Plan of Care Reviewed With: patient, son  Outcome Summary: PT initial eval completed. Pt limited by decreased functional endurance, balance deficit, generalized weakness, and dysarthria. Pt amb 150ft with RWx and min Ax1+1 with poor BLE coordination noted. Rec continued skilled IP PT to increase indep with mobility. d/c rec for HWA and HH PT/OT.     Time Calculation:   PT Charges     Row Name 09/13/21 1505             Time Calculation    Start Time   1130  -AY      PT Received On  09/13/21  -AY      PT Goal Re-Cert Due Date  09/23/21  -AY         Untimed Charges    PT Eval/Re-eval Minutes  46  -AY         Total Minutes    Untimed Charges Total Minutes  46  -AY       Total Minutes  46  -AY        User Key  (r) = Recorded By, (t) = Taken By, (c) = Cosigned By    Initials Name Provider Type    AY Krystin Rutherford, PT Physical Therapist        Therapy Charges for Today     Code Description Service Date Service Provider Modifiers Qty    68066672431 HC PT EVAL LOW COMPLEXITY 4 9/13/2021 Krystin Rutherford, PT GP 1          PT G-Codes  Outcome Measure Options: AM-PAC 6 Clicks Basic Mobility (PT), Modified [place]  AM-PAC 6 Clicks Score (PT): 18  AM-PAC 6 Clicks Score (OT): 14  Modified [place] Scale: 3 - Moderate disability.  Requiring some help, but able to walk without assistance.    Krystin Rutherford PT  9/13/2021

## 2021-09-13 NOTE — PLAN OF CARE
Goal Outcome Evaluation:  Plan of Care Reviewed With: patient, son           Outcome Summary: PT initial eval completed. Pt limited by decreased functional endurance, balance deficit, generalized weakness, and dysarthria. Pt amb 150ft with RWx and min Ax1+1 with poor BLE coordination noted. Rec continued skilled IP PT to increase indep with mobility. d/c rec for HWA and HH PT/OT.

## 2021-09-13 NOTE — PLAN OF CARE
Goal Outcome Evaluation:  Plan of Care Reviewed With: patient, son           Outcome Summary: OT eval complete. Pt presents w/ dysarthria, decreased activity tolerance, generalized weakness, coordination deficits, and mild balance deficits limiting her ADL independnece. Pt would benefit from continued skilled OT services to address current functional deficits, recommend education on AE for LB ADLs next session. Recommend home w/ A & HHOT/PT at discharge.

## 2021-09-13 NOTE — PLAN OF CARE
Goal Outcome Evaluation:  Plan of Care Reviewed With: patient           Outcome Summary: Patient NIH at start of shift a 3.  Speech is still slurred at times but much improved.  She is alert and oriented times 4.  SBP has been stable without the use of cardene.  She has been afebrile and has denied pain throughout the shift. She has had low urinary output. Will continue to monitor.

## 2021-09-13 NOTE — PROGRESS NOTES
Stroke Progress Note       Chief Complaint: aphasia and right facial droop    Subjective    Subjective     Subjective:  No acute events overnight.  Patient's speech has significantly improved.  After further discussion she states that she discontinued her Eliquis on 9/5 due to the pharmacy being unable to fill her prescription as she had run out of refills.  She was previously on Eliquis 5 mg twice daily per medical record.    Review of Systems   Constitutional: Negative.    HENT: Positive for trouble swallowing.    Eyes: Negative.  Negative for visual disturbance.   Respiratory: Negative.    Cardiovascular: Negative.    Gastrointestinal: Positive for diarrhea, nausea and vomiting.   Genitourinary: Negative.  Negative for flank pain.   Musculoskeletal: Positive for arthralgias (Chronic) and back pain (Chronic).   Skin: Negative.    Neurological: Positive for facial asymmetry and speech difficulty. Negative for dizziness, weakness, numbness and headaches.   Hematological: Bruises/bleeds easily.   Psychiatric/Behavioral: Negative.           Objective    Objective      Temp:  [97.7 °F (36.5 °C)-98.3 °F (36.8 °C)] 98.2 °F (36.8 °C)  Heart Rate:  [71-94] 94  Resp:  [16-18] 16  BP: (101-147)/(43-96) 140/76    Neurological Exam  Mental Status  Awake, alert and oriented to person, place and time.Alert. Mild dysarthria present. Language is fluent with no aphasia. Attention and concentration are normal.    Cranial Nerves  CN II: Visual fields full to confrontation.  CN III, IV, VI: Extraocular movements intact bilaterally. Pupils equal round and reactive to light bilaterally.  CN V: Facial sensation is normal.  CN VII:  Right: There is central facial weakness. Slight.  CN VIII: Hearing intact bilaterally.  CN IX, X: Palate elevates symmetrically  CN XI: Shoulder shrug strength is normal.  CN XII: Tongue midline without atrophy or fasciculations.    Motor  Normal muscle bulk throughout. No fasciculations present. Normal muscle  tone. Strength is 5/5 throughout all four extremities.    Sensory  Light touch is normal in upper and lower extremities.     Coordination  No dysmetria.    Gait  Not assessed.      Physical Exam  Vitals and nursing note reviewed.   Constitutional:       Appearance: Normal appearance.   HENT:      Head: Normocephalic and atraumatic.      Mouth/Throat:      Mouth: Mucous membranes are moist.      Pharynx: Oropharynx is clear.   Eyes:      Extraocular Movements: Extraocular movements intact.      Pupils: Pupils are equal, round, and reactive to light.   Cardiovascular:      Rate and Rhythm: Normal rate and regular rhythm.      Pulses: Normal pulses.   Pulmonary:      Effort: Pulmonary effort is normal.      Comments: On room air  Genitourinary:     Comments: External catheter in place  Skin:     General: Skin is warm and dry.      Comments: Right femoral groin puncture site with intact dressing, no drainage, soft with no hematoma   Neurological:      Mental Status: She is alert and oriented to person, place, and time.      Cranial Nerves: Cranial nerve deficit and dysarthria present.      Sensory: No sensory deficit.      Motor: No weakness.      Coordination: Coordination normal.      Deep Tendon Reflexes: Strength normal.   Psychiatric:         Mood and Affect: Mood normal.         Behavior: Behavior normal.         Results Review:    I reviewed the patient's new clinical results.    CTA head/neck reveals a left M2 occlusion.     CT perfusion reveals M2 occlusion, small core infarct with moderate amount of ischemic penumbra.    MRI brain without contrast reveals small evolving left frontal/temporal infarct, with no evidence of hemorrhage.    Dual-energy CT scan on 9/13 is negative for hemorrhage also demonstrates evolving left frontal/temporal infarct.    A1c 10.10  Total cholesterol 262, triglycerides 222,   Glucose 132  Urinalysis with large +3 leuk esterase and positive nitrate, 4+ bacteria  EKG with normal  sinus rhythm, left bundle branch block    Results for orders placed during the hospital encounter of 09/12/21    Adult Transthoracic Echo Complete W/ Cont if Necessary Per Protocol (With Agitated Saline)    Interpretation Summary  · Saline test results are negative.  · Estimated right ventricular systolic pressure from tricuspid regurgitation is normal (<35 mmHg).  · The following left ventricular wall segments are hypokinetic: basal anterolateral, basal inferolateral, mid inferolateral, apical inferior, mid inferior, basal inferoseptal, mid inferoseptal, basal anterior, basal inferior and basal inferoseptal. The following left ventricular wall segments are akinetic: mid anterior, apical anterior, mid anterolateral, apical lateral, apical septal, apex and mid anteroseptal.  · Estimated left ventricular EF = 40% Estimated left ventricular EF was in agreement with the calculated left ventricular EF. Left ventricular ejection fraction appears to be 36 - 40%. Left ventricular systolic function is mildly decreased.  · Left ventricular diastolic function is consistent with (grade II w/high LAP) pseudonormalization.  · Normal right ventricular cavity size, wall thickness, systolic function and septal motion noted.  · The aortic valve exhibits mild sclerosis.  · No evidence of pulmonary hypertension is present.  · There is no evidence of pericardial effusion.  · No evidence of a patent foramen ovale.        Assessment/Plan     Assessment/Plan:    This is an 85-year-old right-handed  female with known medical diagnosis of atrial fibrillation (on chronic Eliquis) who presented to the emergency department today for further evaluation of right-sided weakness, facial droop, and expressive aphasia. CT of the head was negative for hemorrhage and/or acute process however due to LKW >4.5 hours.  CTA of the head/neck and CT perfusion scan were performed which revealed a left M2 occlusion therefore she was taken to the cath  lab for a mechanical thrombectomy.        1.  Acute right M2 ischemic stroke, etiology cardioembolic  -TIA/CVA order set without thrombolytic therapy is in place  -MRI of the brain without contrast reveals evolving left frontal/temporal stroke, small amount of hemorrhagic conversion present on GRE sequence.  Discussed with Dr. Dean who has reviewed imaging and is OK with resuming Eliquis 5mg BID.   -2D echocardiogram with normal left atrium size, no interseptal aneurysm, and no evidence of PFO.  EF 40%  -Okay to work out of bed with PT/OT  -SLP has evaluated patient and recommends FEES  -ASA 81 mg oral or 300 mg rectal today and daily     2.  Chronic atrial fibrillation  -Patient on Eliquis prior to arrival.  Initially the patient was thought to be compliant with medications however upon discussion today the patient states she has not taken her Eliquis since 9/5 due to pharmacy being unable to refill her prescription.   -CT of the head without contrast this morning was negative for hemorrhage  -Okay to resume patient's Eliquis 2.5 mg daily  -Rate control per Intensivist    3.  Diabetes Mellitus type 2, uncontrolled  -Management per Intensivist  -Maintain euglycemia  -A1c 10.10, we will have diabetes educator meet with the patient  -Patient states that her diabetes has been managed by her primary care physician however she has seen an endocrinologist recently.  He is not sure what her last A1c was.  -Have her follow-up with her endocrinologist at discharge    4.  Hypertension  -Okay to resume patient's home blood pressure medications  -Goal pressure 130/80    5.  Hyperlipidemia  -Patient on atorvastatin 10 mg at home, will increase to 80 mg nightly to meet high intensity statin requirements  -LDL on this admission was 170    6.  Urinary tract infection  -Large +3 leuk esterase and positive nitrate, 4+ bacteria  -Management per Intensivist, patient has been started on ceftriaxone     Discussed plan of care with the  patient, primary RN, Dr. Ulloa, and Dr. Chaves.      Ok for patient to be transferred to telemetry.  Stroke neurology will continue to follow.      Irma Segundo, TAYLOR  09/13/21  09:42 EDT

## 2021-09-13 NOTE — THERAPY EVALUATION
Patient Name: Sissy Ballard  : 1935    MRN: [id number]                              Today's Date: 2021       Admit Date: 2021    Visit Dx:     ICD-10-CM ICD-9-CM   1. Acute CVA (cerebrovascular accident) (CMS/HCC)  I63.9 434.91   2. Oropharyngeal dysphagia  R13.12 787.22   3. Cognitive communication deficit  R41.841 799.52     Patient Active Problem List   Diagnosis   • Ischemic cerebrovascular accident (CVA) (CMS/[facility])   • Essential hypertension   • Type 2 diabetes mellitus (CMS/HCC)   • Longstanding persistent atrial fibrillation (CMS/HCC)   • Stage 3b chronic kidney disease (CMS/[facility])   • Hypothyroidism (acquired)     Past Medical History:   Diagnosis Date   • Congestive heart failure (CHF) (CMS/[facility])    • Coronary artery disease     MI,  or    • Essential hypertension 2021   • Hypothyroid    • Hypothyroidism (acquired) 2021   • Nephrolithiasis    • Permanent atrial fibrillation (CMS/HCC) on Eliquis 2021   • Type 2 diabetes mellitus (CMS/HCC) 2021     Past Surgical History:   Procedure Laterality Date   • APPENDECTOMY      Performed at the time of her hysterectomy   • CARDIAC CATHETERIZATION       or  with stent she does not know details   • CATARACT EXTRACTION, BILATERAL     •  SECTION      X2   • TONSILLECTOMY     • TOTAL ABDOMINAL HYSTERECTOMY WITH SALPINGO OOPHORECTOMY      For ovarian cancer     General Information     Row Name 21 1422          OT Time and Intention    Document Type  evaluation  -MA     Mode of Treatment  occupational therapy  -MA     Row Name 21 1422          General Information    Patient Profile Reviewed  yes  -MA     Prior Level of Function  independent:;bed mobility;transfer;all household mobility;community mobility;ADL's pt uses RW for mobility at baseline  -MA     Existing Precautions/Restrictions  fall;other (see comments) dysarthria, coordination deficits  -MA     Barriers to Rehab  medically complex  -MA     Row  Name 09/13/21 1422          Living Environment    Lives With  child(mayra), adult pt lives w/ her son  -MA     Row Name 09/13/21 1422          Home Main Entrance    Number of Stairs, Main Entrance  none  -MA     Row Name 09/13/21 1422          Stairs Within Home, Primary    Number of Stairs, Within Home, Primary  none  -MA     Row Name 09/13/21 1422          Cognition    Orientation Status (Cognition)  oriented x 3  -MA     Row Name 09/13/21 1422          Safety Issues, Functional Mobility    Safety Issues Affecting Function (Mobility)  awareness of need for assistance;insight into deficits/self-awareness;sequencing abilities  -MA     Impairments Affecting Function (Mobility)  balance;endurance/activity tolerance;strength;coordination;motor control  -MA       User Key  (r) = Recorded By, (t) = Taken By, (c) = Cosigned By    Initials Name Provider Type    Brenda Edwards OT Occupational Therapist          Mobility/ADL's     Row Name 09/13/21 1424          Bed Mobility    Bed Mobility  supine-sit  -MA     Supine-Sit [place] (Bed Mobility)  minimum assist (75% patient effort);1 person assist;verbal cues  -MA     Assistive Device (Bed Mobility)  bed rails;head of bed elevated  -MA     Comment (Bed Mobility)  Pt min Ax1 to bring trunk upright, VC's for sequencing and hand placement  -MA     Row Name 09/13/21 1424          Transfers    Transfers  sit-stand transfer  -MA     Comment (Transfers)  Pt CGA for STS w/ RW, VC's for sequencing and hand placement  -MA     Sit-Stand [place] (Transfers)  contact guard;verbal cues  -MA     Row Name 09/13/21 1424          Sit-Stand Transfer    Assistive Device (Sit-Stand Transfers)  walker, front-wheeled  -MA     Row Name 09/13/21 1424          Functional Mobility    Functional Mobility- Comment  Deferred to PT  -MA     Row Name 09/13/21 1424          Activities of Daily Living    BADL Assessment/Intervention  lower body dressing;upper body dressing Pt declined grooming  tasks, need for toileting  -MA     [facility] Name 09/13/21 1424          Lower Body Dressing Assessment/Training    [place] Level (Lower Body Dressing)  doff;don;socks;maximum assist (25% patient effort);verbal cues  -MA     Comment (Lower Body Dressing)  Pt w/ increased difficulty reaching feet & BUE coordination deficits limiting LB ADL independence. Discussed AE for LB ADLs, pt interested.  -MA     Row Name 09/13/21 1424          Upper Body Dressing Assessment/Training    [place] Level (Upper Body Dressing)  don;pajama/robe;minimum assist (75% patient effort);verbal cues back gown  -MA     Position (Upper Body Dressing)  edge of bed sitting  -MA       User Key  (r) = Recorded By, (t) = Taken By, (c) = Cosigned By    Initials Name Provider Type    Brenda Edwards OT Occupational Therapist        Obj/Interventions     [facility] Name 09/13/21 1427          Sensory Assessment (Somatosensory)    Sensory Assessment (Somatosensory)  UE sensation intact  -MA     Row Name 09/13/21 1427          Vision Assessment/Intervention    Visual Impairment/Limitations  WFL  -MA     Row Name 09/13/21 1427          Range of Motion Comprehensive    General Range of Motion  bilateral upper extremity ROM WFL  -MA     Row Name 09/13/21 1427          Strength Comprehensive (MMT)    General Manual Muscle Testing (MMT) Assessment  upper extremity strength deficits identified  -MA     Comment, General Manual Muscle Testing (MMT) Assessment  BUE grossly 4-/5  -MA     Row Name 09/13/21 1427          Motor Skills    Motor Skills  coordination  -MA     Coordination  fine motor deficit;gross motor deficit;bilateral;upper extremity;other (see comments) LB ADL tasks  -MA     Row Name 09/13/21 1427          Balance    Balance Assessment  sitting dynamic balance;standing static balance  -MA     Dynamic Sitting Balance  WFL;supported  -MA     Static Standing Balance  mild impairment;supported;standing  -MA     Balance Interventions  sit to  stand;occupation based/functional task  -MA       User Key  (r) = Recorded By, (t) = Taken By, (c) = Cosigned By    Initials Name Provider Type    Brenda Edwards OT Occupational Therapist        Goals/Plan     Row Name 09/13/21 1430          Transfer Goal 1 (OT)    Activity/Assistive Device (Transfer Goal 1, OT)  sit-to-stand/stand-to-sit;bed-to-chair/chair-to-bed;toilet;commode;walker, rolling  -MA     [place] Level/Cues Needed (Transfer Goal 1, OT)  contact guard assist  -MA     Time Frame (Transfer Goal 1, OT)  long term goal (LTG);10 days  -MA     Progress/Outcome (Transfer Goal 1, OT)  goal ongoing  -MA     Row Name 09/13/21 1430          Dressing Goal 1 (OT)    Activity/Device (Dressing Goal 1, OT)  lower body dressing don/doff socks w/ AAD  -MA     [place]/Cues Needed (Dressing Goal 1, OT)  minimum assist (75% or more patient effort);verbal cues required  -MA     Time Frame (Dressing Goal 1, OT)  long term goal (LTG);10 days  -MA     Progress/Outcome (Dressing Goal 1, OT)  goal ongoing  -MA     Row Name 09/13/21 1430          Toileting Goal 1 (OT)    Activity/Device (Toileting Goal 1, OT)  adjust/manage clothing;perform perineal hygiene;commode;grab bar/safety frame  -MA     [place] Level/Cues Needed (Toileting Goal 1, OT)  contact guard assist  -MA     Time Frame (Toileting Goal 1, OT)  long term goal (LTG);10 days  -MA     Progress/Outcome (Toileting Goal 1, OT)  goal ongoing  -MA     Row Name 09/13/21 1430          Grooming Goal 1 (OT)    Activity/Device (Grooming Goal 1, OT)  hair care;oral care;wash face, hands sinkside  -MA     [place] (Grooming Goal 1, OT)  set-up required  -MA     Time Frame (Grooming Goal 1, OT)  long term goal (LTG);10 days  -MA     Progress/Outcome (Grooming Goal 1, OT)  goal ongoing  -MA       User Key  (r) = Recorded By, (t) = Taken By, (c) = Cosigned By    Initials Name Provider Type    Brenda Edwards OT Occupational Therapist        Clinical  Impression     Row Name 09/13/21 1428          Pain Assessment    Additional Documentation  Pain Scale: Numbers Pre/Post-Treatment (Group)  -MA     Row Name 09/13/21 1428          Pain Scale: Numbers Pre/Post-Treatment    Pretreatment Pain Rating  0/10 - no pain  -MA     Posttreatment Pain Rating  0/10 - no pain  -MA     Row Name 09/13/21 1428          Plan of Care Review    Plan of Care Reviewed With  patient;son  -MA     Outcome Summary  OT eval complete. Pt presents w/ dysarthria, decreased activity tolerance, generalized weakness, coordination deficits, and mild balance deficits limiting her ADL independnece. Pt would benefit from continued skilled OT services to address current functional deficits, recommend education on AE for LB ADLs next session. Recommend home w/ A & HHOT/PT at discharge.  -MA     Row Name 09/13/21 1428          Therapy Assessment/Plan (OT)    Rehab Potential (OT)  good, to achieve stated therapy goals  -MA     Criteria for Skilled Therapeutic Interventions Met (OT)  yes;skilled treatment is necessary  -MA     Therapy Frequency (OT)  daily  -MA     Row Name 09/13/21 1428          Therapy Plan Review/Discharge Plan (OT)    Anticipated Discharge Disposition (OT)  home with assist;home with home health  -[facility] 09/13/21 1428          Vital Signs    Pre Systolic BP Rehab  137  -MA     Pre Treatment Diastolic BP  74  -MA     Post Systolic BP Rehab  123  -MA     Post Treatment Diastolic BP  66  -MA     Pretreatment Heart Rate (beats/min)  102  -MA     Posttreatment Heart Rate (beats/min)  81  -MA     Pre SpO2 (%)  95  -MA     O2 Delivery Pre Treatment  room air  -MA     O2 Delivery Intra Treatment  room air  -MA     Post SpO2 (%)  95  -MA     O2 Delivery Post Treatment  room air  -MA     Pre Patient Position  Supine  -MA     Intra Patient Position  Standing  -MA     Post Patient Position  Sitting  -MA     Row Name 09/13/21 1428          Positioning and Restraints    Pre-Treatment  Position  in bed  -MA     Post Treatment Position  bed  -MA     In Bed  sitting EOB;with PT;with family/caregiver  -MA       User Key  (r) = Recorded By, (t) = Taken By, (c) = Cosigned By    Initials Name Provider Type    Brenda Edwards OT Occupational Therapist        Outcome Measures     Row Name 09/13/21 1431          How much help from another is currently needed...    Putting on and taking off regular lower body clothing?  2  -MA     Bathing (including washing, rinsing, and drying)  2  -MA     Toileting (which includes using toilet bed pan or urinal)  2  -MA     Putting on and taking off regular upper body clothing  3  -MA     Taking care of personal grooming (such as brushing teeth)  3  -MA     Eating meals  2  -MA     AM-PAC 6 Clicks Score (OT)  14  -MA     Row Name 09/13/21 1431          Modified [place] Scale    Pre-Stroke Modified [place] Scale  6 - Unable to determine (UTD) from the medical record documentation  -MA     Modified [place] Scale  4 - Moderately severe disability.  Unable to walk without assistance, and unable to attend to own bodily needs without assistance.  -MA     Row Name 09/13/21 1431          Functional Assessment    Outcome Measure Options  AM-PAC 6 Clicks Daily Activity (OT);Modified [place]  -MA       User Key  (r) = Recorded By, (t) = Taken By, (c) = Cosigned By    Initials Name Provider Type    Brenda Edwards OT Occupational Therapist          Occupational Therapy Education                 Title: PT OT SLP Therapies (In Progress)     Topic: Occupational Therapy (In Progress)     Point: ADL training (Done)     Description:   Instruct learner(s) on proper safety adaptation and remediation techniques during self care or transfers.   Instruct in proper use of assistive devices.              Learning Progress Summary           Patient Acceptance, E, VU by MA at 9/13/2021 6242                   Point: Home exercise program (Not Started)     Description:   Instruct learner(s) on  appropriate technique for monitoring, assisting and/or progressing therapeutic exercises/activities.              Learner Progress:  Not documented in this visit.          Point: Precautions (Done)     Description:   Instruct learner(s) on prescribed precautions during self-care and functional transfers.              Learning Progress Summary           Patient Acceptance, E, VU by MA at 9/13/2021 1119                   Point: Body mechanics (Done)     Description:   Instruct learner(s) on proper positioning and spine alignment during self-care, functional mobility activities and/or exercises.              Learning Progress Summary           Patient Acceptance, E, VU by MA at 9/13/2021 1119                               User Key     Initials Effective Dates Name Provider Type Discipline    MA 11/19/20 -  Brenda Mcgrath, OT Occupational Therapist OT              OT Recommendation and Plan  Therapy Frequency (OT): daily  Plan of Care Review  Plan of Care Reviewed With: patient, son  Outcome Summary: OT eval complete. Pt presents w/ dysarthria, decreased activity tolerance, generalized weakness, coordination deficits, and mild balance deficits limiting her ADL independnece. Pt would benefit from continued skilled OT services to address current functional deficits, recommend education on AE for LB ADLs next session. Recommend home w/ A & HHOT/PT at discharge.     Time Calculation:   Time Calculation- OT     Row Name 09/13/21 1433             Time Calculation- OT    OT Start Time  1119  -MA      OT Received On  09/13/21  -MA      OT Goal Re-Cert Due Date  09/23/21  -MA         Timed Charges    60668 - OT Therapeutic Activity Minutes  2  -MA      29733 - OT Self Care/Mgmt Minutes  6  -MA         Untimed Charges    OT Eval/Re-eval Minutes  31  -MA         Total Minutes    Timed Charges Total Minutes  8  -MA      Untimed Charges Total Minutes  31  -MA       Total Minutes  39  -MA        User Key  (r) = Recorded By, (t) =  Taken By, (c) = Cosigned By    Initials Name Provider Type    Brenda Edwards OT Occupational Therapist        Therapy Charges for Today     Code Description Service Date Service Provider Modifiers Qty    24583024207 HC OT SELF CARE/MGMT/TRAIN EA 15 MIN 9/13/2021 Brenda Mcgrath OT GO 1    [id number]  OT EVAL LOW COMPLEXITY 3 9/13/2021 Brenda Mcgrath OT GO 1               Brenda Mcgrath OT  9/13/2021

## 2021-09-13 NOTE — PLAN OF CARE
Goal Outcome Evaluation:  Plan of Care Reviewed With: patient, son      SLP evaluation and re-evaluation completed. Will address dysphagia and cognitive communication. Please see note for further details and recommendations.

## 2021-09-13 NOTE — PROGRESS NOTES
"Critical Care Note     LOS: 1 day   Patient Care Team:  Paris Gibbs APRN as PCP - General (Family Medicine)    Chief Complaint/Reason for visit:    Chief Complaint   Patient presents with   • Stroke       Subjective     84-year-old woman with diabetes, hypertension, atrial fibrillation, congestive heart failure, coronary artery disease, hypothyroidism, chronic kidney disease presenting with acute CVA, expressive aphasia and right-sided weakness.  She underwent thrombectomy with improvement in her NIH stroke score from a 10 to an 8.  Her Eliquis was apparently stopped in July.  Her baseline serum creatinine is 1.46.      Interval History:     Her NIH stroke score improved further to a 3.  Speech did a fees test and ordered a Dysphagia 4 diet.    Review of Systems:    All systems were reviewed and negative except as noted in subjective.    Medical history, surgical history, social history, family history reviewed    Objective     Intake/Output:    Intake/Output Summary (Last 24 hours) at 9/13/2021 1401  Last data filed at 9/13/2021 1300  Gross per 24 hour   Intake 240 ml   Output 980 ml   Net -740 ml       Nutrition:  Diet Dysphagia; IV - Mechanical Soft No Mixed Consistencies; Nectar / Syrup Thick; Cardiac, Consistent Carbohydrate    Infusions:  niCARdipine, 5-15 mg/hr, Last Rate: Stopped (09/13/21 1302)          Telemetry: Sinus rhythm             Vital Signs  Blood pressure 137/74, pulse 101, temperature 98.2 °F (36.8 °C), temperature source Oral, resp. rate 18, height 149.9 cm (59\"), weight 68.3 kg (150 lb 9.2 oz), SpO2 95 %.    Physical Exam:  General Appearance:   Pleasant older woman in no distress   Head:   Normal cephalic, atraumatic   Eyes:          Pupils equal and reactive to light.  No jaundice.  Conjunctiva pink   Ears:     Throat:  Oral mucosa moist   Neck:  Trachea midline, no palpable thyroid   Back:      Lungs:    Symmetric chest expansion.  Breath sounds are bilateral without wheeze    Heart:   " Regular rhythm, S1, S2 auscultated   Abdomen:    Bowel sounds present, soft, nontender   Rectal:   Deferred   Extremities:  No pretibial edema or cyanosis.  Cath site without hematoma   Pulses:  Pedal pulses present   Skin:  Warm and dry, no rash   Lymph nodes:    Neurologic:  Alert with some dysarthria and mild right facial droop.  No motor drift upper or lower extremities      Results Review:     I reviewed the patient's new clinical results.   Results from last 7 days   Lab Units 09/13/21  0433 09/12/21  1212 09/12/21  1154   SODIUM mmol/L 140  --   --    POTASSIUM mmol/L 4.3  --   --    CHLORIDE mmol/L 101  --   --    CO2 mmol/L 25.0  --   --    BUN mg/dL 31*  --   --    CREATININE mg/dL 1.38*  --  1.50*   CALCIUM mg/dL 9.7  --   --    ALT (SGPT) U/L  --  12  --    AST (SGOT) U/L  --  17  --    GLUCOSE mg/dL 75  --   --      Results from last 7 days   Lab Units 09/12/21  1212 09/12/21  1154   WBC 10*3/mm3 8.31  --    HEMOGLOBIN g/dL 11.6*  --    HEMOGLOBIN, POC g/dL  --  14.3   HEMATOCRIT % 35.3  --    HEMATOCRIT POC %  --  42   PLATELETS 10*3/mm3 194  --      Results from last 7 days   Lab Units 09/12/21  1154   PH, ARTERIAL pH units 7.45     No results found for: BLOODCX  No results found for: URINECX    I reviewed the patient's new imaging including images and reports.      All medications reviewed.   apixaban, 5 mg, Oral, Q12H  aspirin, 81 mg, Oral, Daily   Or  aspirin, 300 mg, Rectal, Daily  atorvastatin, 80 mg, Oral, Nightly  cefTRIAXone, 1 g, Intravenous, Q24H  insulin lispro, 0-9 Units, Subcutaneous, TID With Meals  [START ON 9/14/2021] levothyroxine, 50 mcg, Oral, Q AM  metoprolol tartrate, 25 mg, Oral, Q12H  sodium chloride, 10 mL, Intravenous, Q12H          Assessment/Plan       Ischemic cerebrovascular accident (CVA) (CMS/HCC)    Essential hypertension    Type 2 diabetes mellitus (CMS/HCC)    Longstanding persistent atrial fibrillation (CMS/HCC)    Stage 3b chronic kidney disease (CMS/HCC)     Hypothyroidism (acquired)    #1 acute ischemic stroke status post thrombectomy with significant improvement.  Today her NIH stroke scale is a 3.  She was evaluated by speech and underwent a fees and a dysphagia 4 diet has been ordered.  Presumed source is cardioembolic because her Eliquis was stopped    #2 history of atrial fibrillation with possible tachybradycardia syndrome.  Echocardiogram revealed some hypokinetic wall motion, and EF reduced at 36 to 40% with a sclerotic aortic valve and no PFO.    #3 diabetes mellitus type 2, poorly controlled with an A1c of 10.  She apparently uses Levemir at home.  Glucoses are running     #4 hypothyroidism on replacement therapy    #6 chronic kidney disease stage IIIb serum creatinine is stable.  She is voiding without a Balderrama catheter and made 880 mL of urine plus unmeasured.  She did receive some contrast and we will need to watch for worsening function.    #7 urinalysis with 4+ bacteria and many white cells consistent with a probable urinary tract infection.  We will need to start antibiotics    #8 hypertension, on a Cardene drip overnight.  Metoprolol was restarted and drip was successfully weaned off.      PLAN:  Sliding scale insulin  Diabetes educator  Once tolerating p.o. diet, add Levemir    Systolic pressure less than 185  Restart metoprolol  Hold lisinopril for now and monitor renal function    Continue thyroid replacement and changed to p.o.    Monitor NIH stroke scale    Monitor rhythm    Restart Eliquis    Aspirin, statin    Start Rocephin for a short course to treat urinary tract infection that was present on admission    VTE Prophylaxis: anticoagulated    Stress Ulcer Prophylaxis: none, will restart Protonix    Patient discussed in multidisciplinary rounds    Maite Ulloa MD  09/13/21  14:01 EDT      Time: 30 minutes

## 2021-09-13 NOTE — CASE MANAGEMENT/SOCIAL WORK
Discharge Planning Assessment  [facility]     Patient Name: Sissy Ballard  MRN: [id number]  Today's Date: 9/13/2021    Admit Date: 9/12/2021    Discharge Needs Assessment     Row Name 09/13/21 1237       Living Environment    Lives With  child(mayra), adult    Name(s) of Who Lives With Patient  Charley Ballard-son    Current Living Arrangements  home/apartment/condo Lives in [facility]    Primary Care Provided by  self    Provides Primary Care For  no one    Family Caregiver if Needed  child(mayra), adult    Family Caregiver Names  Son Charley but he works full time days.    Quality of Family Relationships  helpful;involved;supportive    Able to Return to Prior Arrangements  yes       Resource/Environmental Concerns    Resource/Environmental Concerns  none    Transportation Concerns  car, none       Transition Planning    Patient/Family Anticipates Transition to  home with family    Patient/Family Anticipated Services at Transition  none    Transportation Anticipated  family or friend will provide       Discharge Needs Assessment    Equipment Currently Used at Home  cane, straight;walker, rolling;other (see comments) Pt has a life alert necklace but does not use.    Concerns to be Addressed  denies needs/concerns at this time    Anticipated Changes Related to Illness  none    Equipment Needed After Discharge  none    Current Discharge Risk  dependent with mobility/activities of daily living        Discharge Plan     Row Name 09/13/21 3374       Plan    Plan  Home with family    Patient/Family in Agreement with Plan  yes    Plan Comments  Met with pt and her son Jace, who is her POA, at . Pt lives with her other son Charley in [facility]. She is independent of ADL's but does use a RW occasionally and will use her cane when she goes out to shop. She drove until last month but her son now drives her. She does have a life alert necklace but does not use. Pt has used HH in the past but not current with anyone and  "can't remember who she used before. She reports she has been on Eliquis for a long time and went to refill at [facility] and was not able to obtain it, likely due to the script needed to be re-newed. Pt plans to d/c home with family to assist. Her son does work and cannot be home all day with her. Family asked about medicaid waivers or paying family to care for the pt. Confirmed with the SW that pt would not qualify for medicaid since her income is above the medicaid limit. Pt and son aware. Plan at this time is home with family to assist. CM will follow. PT/OT pending. Fely ext. [phone]        Continued Care and Services - Admitted Since 9/12/2021    Coordination has not been started for this encounter.       Expected Discharge Date and Time     Expected Discharge Date Expected Discharge Time    Sep 17, 2021         Demographic Summary     Row Name 09/13/21 1234       General Information    Referral Source  admission list    Preferred Language  English     Used During This Interaction  no    General Information Comments  PCP is Paris Gibbs. Son Anton \"Jace\" Nellie is [facility].       Contact Information    Permission Granted to Share Info With  ;family/designee Jace-son        Functional Status     Row Name 09/13/21 1236       Functional Status    Usual Activity Tolerance  fair    Current Activity Tolerance  fair       Functional Status, IADL    Medications  independent    Meal Preparation  independent    Housekeeping  independent    Laundry  independent    Shopping  assistive person Pt drove herself until last month, son now drives her.       Employment/    Employment/ Comments  Has [employer] Medicare. Able to afford meds and uses [facility] in [place].        Psychosocial    No documentation.       Abuse/Neglect    No documentation.       Legal    No documentation.       Substance Abuse    No documentation.       Patient Forms    No documentation.           Fely Ballard, " RN

## 2021-09-13 NOTE — MBS/VFSS/FEES
Acute Care - Speech Language Pathology Re-Evaluation  [facility]   Fiberoptic Endoscopic Evaluation of Swallowing (FEES)  Clinical Swallow Re-Evaluation  & Cognitive Communication Treatment     Patient Name: Sissy Ballard  : 1935  MRN: [id number]  Today's Date: 2021             Admit Date: 2021  Visit Dx:    ICD-10-CM ICD-9-CM   1. Acute CVA (cerebrovascular accident) (CMS/HCC)  I63.9 434.91   2. Oropharyngeal dysphagia  R13.12 787.22   3. Cognitive communication deficit  R41.841 799.52     Patient Active Problem List   Diagnosis   • Ischemic cerebrovascular accident (CVA) (CMS/HCC)   • Essential hypertension   • Type 2 diabetes mellitus (CMS/HCC)   • Longstanding persistent atrial fibrillation (CMS/HCC)   • Stage 3b chronic kidney disease (CMS/HCC)   • Hypothyroidism (acquired)     Past Medical History:   Diagnosis Date   • Congestive heart failure (CHF) (CMS/HCC)    • Coronary artery disease     MI,  or    • Essential hypertension 2021   • Hypothyroid    • Hypothyroidism (acquired) 2021   • Nephrolithiasis    • Permanent atrial fibrillation (CMS/HCC) on Eliquis 2021   • Type 2 diabetes mellitus (CMS/HCC) 2021     Past Surgical History:   Procedure Laterality Date   • APPENDECTOMY      Performed at the time of her hysterectomy   • CARDIAC CATHETERIZATION       or  with stent she does not know details   • CATARACT EXTRACTION, BILATERAL     •  SECTION      X2   • TONSILLECTOMY     • TOTAL ABDOMINAL HYSTERECTOMY WITH SALPINGO OOPHORECTOMY      For ovarian cancer       SLP Recommendation and Plan           Swallow Criteria for Skilled Therapeutic Interventions Met: demonstrates skilled criteria  Anticipated Discharge Disposition (SLP): inpatient rehabilitation facility     Therapy Frequency (Swallow): 5 days per week  Predicted Duration Therapy Intervention (Days): until discharge     Plan for Continued Treatment (SLP): FEES completed. Initiate dysphagia  level 4 diet and nectar-thick liquids. Pt would benefit from implementing extra swallow intermittently during po intake. F/u to begin addressing dysphagia deficits in therapy.                  SLP EVALUATION (last 72 hours)      SLP SLC Evaluation     Row Name 09/12/21 1400       Communication Assessment/Intervention    Document Type  evaluation  -ML    Subjective Information  no complaints  -ML    Patient Observations  alert;cooperative;agree to therapy  -ML    Patient/Family/Caregiver Comments/Observations  Son in room  -ML    Care Plan Review  evaluation/treatment results reviewed;care plan/treatment goals reviewed  -ML    Patient Effort  excellent  -ML    Comment  Food particles from home breakfast in oral cavity. Dentures in place/not removed for thrombectomy.  -ML    Symptoms Noted During/After Treatment  none  -ML       General Information    Patient Profile Reviewed  yes  -ML    Pertinent History Of Current Problem  Pt is s/p thrombectomy. Acute left MCA CVA. Hx DM/HTN.   -ML    Precautions/Limitations, Vision  corrective lenses needed for reading  -ML    Precautions/Limitations, Hearing  WFL;for purposes of eval  -ML    Patient Level of Education  12th grade  -ML    Prior Level of Function-Communication  WFL  -ML    Plans/Goals Discussed with  patient and family;agreed upon  -ML    Barriers to Rehab  none identified  -ML       Pain    Additional Documentation  Pain Scale: Numbers Pre/Post-Treatment (Group)  -ML       Pain Scale: Numbers Pre/Post-Treatment    Pretreatment Pain Rating  0/10 - no pain  -ML       Comprehension Assessment/Intervention    Comprehension Assessment/Intervention  Auditory Comprehension  -ML       Auditory Comprehension Assessment/Intervention    Auditory Comprehension (Communication)  WFL  -ML    Able to Identify Objects/Pictures (Communication)  body part;familiar objects;WNL  -ML    Answers Questions (Communication)  yes/no;personal;simple;WFL expressive language and motor speech  impairment impacts.  -ML    Able to Follow Commands (Communication)  2-step;WFL  -ML    Auditory Comprehension Communication, Comment  Apraxia and aphasia impacts extent of response to questions  -ML       Expression Assessment/Intervention    Expression Assessment/Intervention  verbal expression  -ML       Verbal Expression Assessment/Intervention    Verbal Expression  moderate impairment  -ML    Automatic Speech (Communication)  counting 1-20;days of week;months of year;severe impairment  -ML    Repetition  words;severe impairment Apraxia  -ML    Phrase Completion  automatic/predictable;mild impairment  -ML    Responsive Naming  moderate impairment  -ML    Confrontational Naming  high frequency;moderate impairment  -ML    Sentence Formulation  severe impairment;simple  -ML       Oral Motor Structure and Function    Oral Motor Structure and Function  mild impairment  -ML    Dentition Assessment  upper dentures/partial in place  -ML    Mucosal Quality  dry  -ML       Oral Musculature and Cranial Nerve Assessment    Oral Motor General Assessment  oral labial or buccal impairment  -ML    Oral Labial or Buccal Impairment, Detail, Cranial Nerve VII (Facial):  right labial droop  -ML       Motor Speech Assessment/Intervention    Motor Speech Function  mild impairment  -ML    Characteristics Consistent with Dysarthria  slurred speech  -ML    Characteristics Consistent with Apraxia  inconsistent errors;vowel distortion;distortions;substitutions  -ML    Initiation of Phonation (Communication)  voluntary;involuntary - (e.g. sneezing, laughing, yawning);WNL  -ML    Automatic Speech (Communication)  counting 1-20;days of week;months of year;severe impairment  -ML    Verbal Repetition (Communication)  monosyllabic words;polysyllabic words;moderate impairment  -ML    Phase Completion  automatic/predictable;mild impairment  -ML    Conversational Speech (Communication)  severe impairment  -ML    Motor Speech, Comment  Aware of  deficits but unable to correct.  -ML       SLP Clinical Impressions    SLP Diagnosis  Pt presents with verbal apraxia and aphasia.  Difficulty with automatic speech tasks and repetition.  Attempts to speak but recognizable speech limited to one word with some degree interpretation. Also with dysarthria and aphasia component.  Auditory comprehension appears intact.  Reading is functional at the word level.  Writing was not assessed. SLP to treat speech/language deficits.  -ML    Rehab Potential/Prognosis  good  -ML    [facility] Criteria for Skilled Therapy Interventions Met  yes  -ML    Functional Impact  difficulty communicating wants, needs;difficulty in expressing complex messages;difficulty communicating in an emergency  -ML    Plan for Continued Treatment (SLP)  SLP to treat communication impairment.  -ML       Recommendations    Therapy Frequency (SLP SLC)  4 days per week;5 days per week  -ML    Predicted Duration Therapy Intervention (Days)  until discharge  -ML    Anticipated Discharge Disposition (SLP)  inpatient rehabilitation facility  -ML       Communication Treatment Objective and Progress Goals (SLP)    Verbal Expression Treatment Objectives  Verbal Expression Treatment Objectives (Group)  -ML    Graphic Expression Treatment Objectives  Graphic Expression Treatment Objectives (Group)  -ML    Motor Speech/Dysarthria Treatment Objectives  Motor Speech/Dysarthria Treatment Objectives (Group)  -ML    Motor Speech/Apraxia Treatment Objectives  Motor Speech/Apraxia Treatment Objectives (Group)  -ML    Additional Goals  Additional Goals (Group)  -ML       Verbal Expression Treatment Objectives    Word Retrieval Skills Selection  word retrieval, SLP goal 1  -ML    Phrase and Sentence Level Response Selection  --  -ML       Word Retrieval Skills Goal 1 (SLP)    Improve Word Retrieval Skills By Goal 1 (SLP)  completing automatic speech task, counting;completing automatic speech task, alphabet;completing automatic  speech task, days of the week;completing automatic speech task, months;confrontational naming task;repeating words;repeating phrases;70%;with moderate cues (50-74%)  -ML    Time Frame (Word Retrieval Goal 1, SLP)  short term goal (STG);by discharge  -ML    Barriers (Word Retrieval Goal 1, SLP)  Apraxia  -ML       Graphic Expression Treatment Objectives    Graphic Expression of Shapes, Letters and Numbers Selection  graphic expression of shapes, letters, and numbers, SLP goal (free text)  -ML       Graphic Expression of Shapes, Letters, Numbers Goal (SLP)    Improve Graphic Expression of Shapes, Letters, and Numbers Goal (SLP)  Probe writing when condition warrants  -ML    Time Frame (Graphic Expression of Shapes, Letters, and Numbers Goal, SLP)  by discharge  -ML       Motor Speech/Dysarthria Treatment Objectives    Articulation Selection  articulation, SLP goal 1  -ML       Articulation Goal 1 (SLP)    Improve Articulation Goal 1 (SLP)  by over-articulating at word level;90%;with moderate cues (50-74%)  -ML    Time Frame (Articulation Goal 1, SLP)  short term goal (STG);by discharge  -ML       Motor Speech/Apraxia Treatment Objectives    Motor Planning Selection  --  -ML    Planning and Execution of Connected Speech Selection  planning and execution of connected speech, SLP goal 1  -ML       Planning and Execution of Connected Speech Goal 1 (SLP)    Improve Planning and Execution of Connected Speech by Goal 1 (SLP)  imitating one syllable words;imitating two syllable words;completing open-ended sentences;repeating phrases;90%;with moderate cues (50-74%)  -ML    Time Frame (Planning and Execution of Connected Speech Goal 1, SLP)  short term goal (STG);by discharge  -       Additional Goals    Additional Goal Selection  additional goal, SLP goal (free text)  -ML       Additional Goal (SLP)    Additional Goal, SLP  LTG: Improve verbal communication  -ML    Time Frame (Additional Goal, SLP)  by discharge  -       User Key  (r) = Recorded By, (t) = Taken By, (c) = Cosigned By    Initials Name Effective Dates    Silva Arellano, CCC-SLP 06/16/21 -              EDUCATION  The patient has been educated in the following areas:     Cognitive Impairment Communication Impairment.          SLP GOALS     Row Name 09/13/21 1015          Oral Nutrition/Hydration Goal 1, SLP  LTG: Pt will return to regular diet and thin liquids w/o s/s of aspiration during 100% of trials w/o cues  -EN    Time Frame (Oral Nutrition/Hydration Goal 1, SLP)  by discharge  -EN          Oral Nutrition/Hydration Goal 2, SLP  Pt will tolerate therapeutic trials of ice/thin H2O and regular solids w/o s/s of aspiration in 60% of trials and no cues in order to demonstrate readiness for repeat instrumental evaluation.  -EN    Time Frame (Oral Nutrition/Hydration Goal 2, SLP)  short term goal (STG)  -EN          Oral Nutrition/Hydration Goal, SLP  Pt will tolerate nectar-thick liquids and soft solids w/o s/s of aspiration or distress w/ 100% acc and no cues.  -EN    Time Frame (Oral Nutrition/Hydration Goal, SLP)  short term goal (STG)  -EN          Activity (Lingual Strengthening Goal 1, SLP)  increase lingual tone/sensation/control/coordination/movement  -EN    Increase Lingual Tone/Sensation/Control/Coordination/Movement  lingual resistance exercises  -EN    [place]/Accuracy (Lingual Strengthening Goal 1, SLP)  independently (over 90% accuracy)  -EN    Time Frame (Lingual Strengthening Goal 1, SLP)  by discharge  -EN          Activity (Pharyngeal Strengthening Goal 1, SLP)  increase timing;increase superior movement of the hyolaryngeal complex;increase anterior movement of the hyolaryngeal complex;increase closure at entrance to airway/closure of airway at glottis;increase squeeze/positive pressure generation  -EN    Increase Timing  prepping - 3 second prep or suck swallow or 3-step swallow  -EN    Increase Superior Movement of the Hyolaryngeal  Complex  effortful pitch glide (falsetto + pharyngeal squeeze)  -EN    Increase Anterior Movement of the Hyolaryngeal Complex  chin tuck against resistance (CTAR)  -EN    Increase Closure at Entrance to Airway/Closure of Airway at Glottis  supraglottic swallow  -EN    Increase Squeeze/Positive Pressure Generation  hard effortful swallow  -EN    [place]/Accuracy (Pharyngeal Strengthening Goal 1, SLP)  independently (over 90% accuracy)  -EN    Time Frame (Pharyngeal Strengthening Goal 1, SLP)  short term goal (STG)  -EN          Activity (Swallow Compensatory Strategies/Techniques Goal 1, SLP)  compensatory strategies;during meal intake;during p.o. trials;extra swallow per bolus;other (see comments) intermittently  -EN    [place]/Accuracy (Swallow Compensatory Strategies/Techniques Goal 1, SLP)  with minimal cues (75-90% accuracy)  -EN    Time Frame (Swallow Compensatory Strategies/Techniques Goal 1, SLP)  short term goal (STG)  -EN          Improve Word Retrieval Skills By Goal 1 (SLP)  completing automatic speech task, counting;completing automatic speech task, alphabet;completing automatic speech task, days of the week;completing automatic speech task, months;confrontational naming task;repeating words;repeating phrases;70%;with moderate cues (50-74%)  -EN    Time Frame (Word Retrieval Goal 1, SLP)  short term goal (STG);by discharge  -EN    Barriers (Word Retrieval Goal 1, SLP)  --    Progress (Word Retrieval Skills Goal 1, SLP)  60%;with minimal cues (75-90%)  -EN    Progress/Outcomes (Word Retrieval Goal 1, SLP)  continuing progress toward goal  -EN          Improve Graphic Expression of Shapes, Letters, and Numbers Goal (SLP)  Probe writing when condition warrants  -EN    Time Frame (Graphic Expression of Shapes, Letters, and Numbers Goal, SLP)  by discharge  -EN    Progress/Outcomes (Graphic Expression of Shapes, Letters, and Numbers Goal, SLP)  goal ongoing  -EN          Improve Articulation Goal 1  (SLP)  by over-articulating at word level;90%;with moderate cues (50-74%)  -EN    Time Frame (Articulation Goal 1, SLP)  short term goal (STG);by discharge  -EN    Progress (Articulation Goal 1, SLP)  50%;with minimal cues (75-90%)  -EN    Progress/Outcomes (Articulation Goal 1, SLP)  continuing progress toward goal  -EN          Improve Planning and Execution of Connected Speech by Goal 1 (SLP)  imitating one syllable words;imitating two syllable words;completing open-ended sentences;repeating phrases;90%;with moderate cues (50-74%)  -EN    Time Frame (Planning and Execution of Connected Speech Goal 1, SLP)  short term goal (STG);by discharge  -EN    Progress (Planning and Execution of Connected Speech Goal 1, SLP)  60%;with minimal cues (75-90%)  -EN    Progress/Outcomes (Planning and Execution of Connected Speech Goal 1, SLP)  continuing progress toward goal  -EN          Additional Goal, SLP  LTG: Improve verbal communication  -EN    Time Frame (Additional Goal, SLP)  by discharge  -EN    Progress/Outcomes (Additional Goal, SLP)  continuing progress toward goal  -EN      User Key  (r) = Recorded By, (t) = Taken By, (c) = Cosigned By    Initials Name Provider Type    Silva Arellano, CCC-SLP Speech and Language Pathologist    EN Octavia Rossi MS, CFY-SLP Speech and Language Pathologist        Time Calculation:     Time Calculation- SLP     Row Name 09/13/21 1234 09/13/21 0941          Time Calculation- SLP    SLP Start Time  1015  -EN  0845  -EN     SLP Received On  09/13/21  -EN  09/13/21  -EN        Untimed Charges    SLP Eval/Re-eval   ST Fiberoptic Endo Eval Swallow - 78832  -EN  ST Eval Oral Pharyng Swallow - 60999  -EN     48205-WM Eval Oral Pharyng Swallow Minutes  --  38  -EN     23382-PJ Fiberoptic Endo Eval Swallow Minutes  83  -EN  --     37394-UG Treatment/ST Modification Prosth Aug Alter   15  -EN  --        Total Minutes    Untimed Charges Total Minutes  98  -EN  38  -EN      Total  Minutes  98  -EN  38  -EN       User Key  (r) = Recorded By, (t) = Taken By, (c) = Cosigned By    Initials Name Provider Type    Octavia Blanchard, MS, CFY-SLP Speech and Language Pathologist          Therapy Charges for Today     Code Description Service Date Service Provider Modifiers Qty    44956848499 HC ST EVAL ORAL PHARYNG SWALLOW 3 2021 Octavia Rossi, MS, CFY-SLP GN 1    79499736759 HC ST FIBEROPTIC ENDO EVAL SWALL 6 2021 Octavia Rossi, MS, CFY-SLP GN 1    [id number] HC ST TREATMENT SPEECH 1 2021 Octavia Rossi, MS, CFY-SLP GN 1            Octavia Rossi MS, CFY-SLP  2021 and     Acute Care - Speech Language Pathology   Swallow Re-Evaluation [facility]     Patient Name: Sissy Ballard  : 1935  MRN: [id number]  Today's Date: 2021             Admit Date: 2021  Visit Dx:     ICD-10-CM ICD-9-CM   1. Acute CVA (cerebrovascular accident) (CMS/HCC)  I63.9 434.91   2. Oropharyngeal dysphagia  R13.12 787.22   3. Cognitive communication deficit  R41.841 799.52     Patient Active Problem List   Diagnosis   • Ischemic cerebrovascular accident (CVA) (CMS/HCC)   • Essential hypertension   • Type 2 diabetes mellitus (CMS/HCC)   • Longstanding persistent atrial fibrillation (CMS/HCC)   • Stage 3b chronic kidney disease (CMS/HCC)   • Hypothyroidism (acquired)     Past Medical History:   Diagnosis Date   • Congestive heart failure (CHF) (CMS/HCC)    • Coronary artery disease     MI,  or    • Essential hypertension 2021   • Hypothyroid    • Hypothyroidism (acquired) 2021   • Nephrolithiasis    • Permanent atrial fibrillation (CMS/HCC) on Eliquis 2021   • Type 2 diabetes mellitus (CMS/HCC) 2021     Past Surgical History:   Procedure Laterality Date   • APPENDECTOMY      Performed at the time of her hysterectomy   • CARDIAC CATHETERIZATION       or  with stent she does not know details   • CATARACT EXTRACTION, BILATERAL     •  SECTION      X2    • TONSILLECTOMY     • TOTAL ABDOMINAL HYSTERECTOMY WITH SALPINGO OOPHORECTOMY      For ovarian cancer       SLP Recommendation and Plan  SLP Swallowing Diagnosis: mild, oral dysphagia, moderate, pharyngeal dysphagia  SLP Diet Recommendation: mechanical soft with no mixed consistencies, nectar thick liquids  Recommended Precautions and Strategies: upright posture during/after eating, small bites of food and sips of liquid, general aspiration precautions, other (see comments) (extra swallow intermittently.)  SLP Rec. for Method of Medication Administration: meds whole, with thick liquids, with pudding or applesauce, as tolerated     Monitor for Signs of Aspiration: yes, notify SLP if any concerns  Recommended Diagnostics: FEES  Swallow Criteria for Skilled Therapeutic Interventions Met: demonstrates skilled criteria  Anticipated Discharge Disposition (SLP): inpatient rehabilitation facility  Rehab Potential/Prognosis, Swallowing: good, to achieve stated therapy goals  Therapy Frequency (Swallow): 5 days per week  Predicted Duration Therapy Intervention (Days): until discharge          Plan for Continued Treatment (SLP): FEES completed. Initiate dysphagia level 4 diet and nectar-thick liquids. Pt would benefit from implementing extra swallow intermittently during po intake. F/u to begin addressing dysphagia deficits in therapy.             SWALLOW EVALUATION (last 72 hours)      SLP Adult Swallow Evaluation     Row Name 09/13/21 1015 09/13/21 0845       Rehab Evaluation    Document Type  evaluation  -EN  re-evaluation  -EN    Subjective Information  no complaints  -EN  no complaints  -EN    Patient Observations  alert;cooperative;agree to therapy  -EN  alert;cooperative;agree to therapy  -EN    Patient/Family/Caregiver Comments/Observations  son present   -EN  no family present   -EN    Care Plan Review  evaluation/treatment results reviewed;care plan/treatment goals reviewed;risks/benefits reviewed;current/potential  barriers reviewed;patient/other agree to care plan  -EN  evaluation/treatment results reviewed;care plan/treatment goals reviewed;risks/benefits reviewed;current/potential barriers reviewed;patient/other agree to care plan  -EN    Patient Effort  good  -EN  good  -EN    Symptoms Noted During/After Treatment  none  -EN  none  -EN       General Information    Patient Profile Reviewed  yes  -EN  yes  -EN    Pertinent History Of Current Problem  See initial eval  -EN  See initial eval  -EN    Current Method of Nutrition  NPO  -EN  NPO  -EN    Precautions/Limitations, Vision  corrective lenses needed for reading  -EN  corrective lenses needed for reading  -EN    Precautions/Limitations, Hearing  WFL;for purposes of eval  -EN  WFL;for purposes of eval  -EN    Prior Level of Function-Communication  WFL  -EN  WFL  -EN    Prior Level of Function-Swallowing  no diet consistency restrictions  -EN  no diet consistency restrictions  -EN    Plans/Goals Discussed with  patient and family;agreed upon  -EN  agreed upon;patient  -EN    Barriers to Rehab  none identified  -EN  none identified  -EN    Patient's Goals for Discharge  patient did not state  -EN  patient did not state  -EN    Family Goals for Discharge  family did not state  -EN  --       Pain    Additional Documentation  Pain Scale: FACES Pre/Post-Treatment (Group)  -EN  Pain Scale: FACES Pre/Post-Treatment (Group)  -EN       Pain Scale: FACES Pre/Post-Treatment    Pain: FACES Scale, Pretreatment  0-->no hurt  -EN  0-->no hurt  -EN    Posttreatment Pain Rating  0-->no hurt  -EN  0-->no hurt  -EN       Oral Motor Structure and Function    Dentition Assessment  --  upper dentures/partial in place  -EN    Secretion Management  --  WNL/WFL  -EN    Mucosal Quality  --  dry  -EN    Volitional Swallow  --  WFL  -EN    Volitional Cough  --  WFL  -EN       Oral Musculature and Cranial Nerve Assessment    Oral Motor General Assessment  --  oral labial or buccal impairment  -EN     Oral Labial or Buccal Impairment, Detail, Cranial Nerve VII (Facial):  --  right labial droop  -EN       General Eating/Swallowing Observations    Respiratory Support Currently in Use  --  --    Eating/Swallowing Skills  --  --    Positioning During Eating  --  --    Utensils Used  --  --    Consistencies Trialed  --  --    Pre SpO2 (%)  --  --    Post SpO2 (%)  --  --       Clinical Swallow Eval    Oral Prep Phase  --  WFL  -EN    Oral Transit  --  WFL  -EN    Oral Residue  --  WFL  -EN    Pharyngeal Phase  --  suspected pharyngeal impairment  -EN    Esophageal Phase  --  unremarkable  -EN    Clinical Swallow Evaluation Summary  --  Re-evaluation completed this am. Improved however continued s/s of aspiration w/ thin liquids via all presentation styles. No overt s/s w/ NT liquids via spoon/cup/straw, pureed, or solid cracker. Intermittent wet vocal quality following conclusion of evaluation. Will order FEES to further evaluate. NPO until FEES completed.   -EN       Oral Prep Concerns    Oral Prep Concerns  --  --       Oral Residue Concerns    Oral Residue Concerns  --  --    Diffuse Residue Throughout Oral Cavity  --  --    Oral Residue Concerns, Comment  --  --       Pharyngeal Phase Concerns    Pharyngeal Phase Concerns  --  cough;wet vocal quality  -EN    Wet Vocal Quality  --  other (see comments) at end of eval  -EN    Multiple Swallows  --  --    Cough  --  thin  -EN       Fiberoptic Endoscopic Evaluation of Swallowing (FEES)    Risks/Benefits Reviewed  risks/benefits explained;patient;family;agreed to eval  -EN  --    Nasal Entry  left:  -EN  --    Scope serial number/identification  [id number]  -EN  --       Anatomy and Physiology    Anatomic Considerations  no anatomic structural deviation  -EN  --    Velopharyngeal  WFL  -EN  --    Base of Tongue  symmetrical  -EN  --    Epiglottis  WFL  -EN  --    Laryngeal Function Breathing  symmetrical  -EN  --    Laryngeal Function Phonation  symmetrical  -EN  --     Laryngeal Function to Breath Hold  TVF contact;sustained closure  -EN  --    Secretion Rating Scale (Ze et al. 1996)  0- normal, no visible secretions  -EN  --    Ice Chips  DNA  -EN  --    Spontaneous Swallow  frequency adequate  -EN  --    Sensory  reduced sensation  -EN  --    Utensils Used  Spoon;Cup;Straw  -EN  --    Consistencies Trialed  thin liquids;nectar-thick liquids;pudding/puree;regular textures  -EN  --       FEES Interpretation    Oral Phase  prespill of liquids into pharynx  -EN  --       Initiation of Pharyngeal Swallow    Initiation of Pharyngeal Swallow  bolus in pyriform sinuses  -EN  --    Pharyngeal Phase  impaired pharyngeal phase of swallowing  -EN  --    Penetration Before the Swallow  thin liquids;secondary to reduced back of tongue control;secondary to delayed swallow initiation or mistiming  -EN  --    Aspiration During the Swallow  thin liquids;secondary to delayed swallow initiation or mistiming;secondary to reduced laryngeal elevation;secondary to reduced vestibular closure;secondary to reduced laryngeal (VF) closure  -EN  --    Response to Penetration  deep;cough  -EN  --    Response to Aspiration  cough;could not clear subglottic material  -EN  --    Rosenbek's Scale  thin:;7-->Level 7;nectar:;pudding/puree:;regular textures:;1-->Level 1  -EN  --    Residue  thin liquids;valleculae;pyriform sinuses;secondary to reduced base of tongue retraction;secondary to reduced posterior pharyngeal wall stripping;secondary to reduced laryngeal elevation;secondary to reduced hyolaryngeal excursion  -EN  --    Response to Residue  cleared residue;with cued swallow  -EN  --    Attempted Compensatory Maneuvers  bolus size;bolus presentation style;additional subsequent swallow  -EN  --    Response to Attempted Compensatory Maneuvers  prevented penetration;prevented aspiration;reduced residue  -EN  --    FEES Summary  Mild oral phase deficits and moderate pharyngeal phase dysphagia. Prespill of  thin liquids into pharynx w/ cup/straw. Penetration before w/ aspiration during the swallow w/ thin liquids via cup and straw. Pt sensed aspiration w/ hard immediate cough. Noted w/ difficulty recovering from aspiration event. No penetration or aspiration w/ nectar-thick liquids via spoon/cup/straw, pudding, or regular solids. Mild vallecular and pyriform sinus residue noted which pt cleared w/ cued consecutive swallow. Recommend level 4 dysphagia diet and nectar-thick liquids. Meds whole as tolerated.   -EN  --       Clinical Impression    SLP Swallowing Diagnosis  mild;oral dysphagia;moderate;pharyngeal dysphagia  -EN  suspected pharyngeal dysphagia  -EN    Functional Impact  risk of aspiration/pneumonia;risk of dehydration  -EN  risk of aspiration/pneumonia;risk of dehydration  -EN    Rehab Potential/Prognosis, Swallowing  good, to achieve stated therapy goals  -EN  good, to achieve stated therapy goals  -EN    Swallow Criteria for Skilled Therapeutic Interventions Met  demonstrates skilled criteria  -EN  demonstrates skilled criteria  -EN    Plan for Continued Treatment (SLP)  FEES completed. Initiate dysphagia level 4 diet and nectar-thick liquids. Pt would benefit from implementing extra swallow intermittently during po intake. F/u to begin addressing dysphagia deficits in therapy.   -EN  FEES  -EN       Recommendations    Therapy Frequency (Swallow)  5 days per week  -EN  --    Predicted Duration Therapy Intervention (Days)  until discharge  -EN  until discharge  -EN    SLP Diet Recommendation  mechanical soft with no mixed consistencies;nectar thick liquids  -EN  NPO  -EN    Recommended Diagnostics  --  FEES  -EN    Recommended Precautions and Strategies  upright posture during/after eating;small bites of food and sips of liquid;general aspiration precautions;other (see comments) extra swallow intermittently.  -EN  general aspiration precautions  -EN    Oral Care Recommendations  Suction toothbrush;Oral Care  BID/PRN  -EN  Suction toothbrush;Oral Care BID/PRN  -EN    SLP Rec. for Method of Medication Administration  meds whole;with thick liquids;with pudding or applesauce;as tolerated  -EN  with pudding or applesauce  -EN    Monitor for Signs of Aspiration  yes;notify SLP if any concerns  -EN  yes;notify SLP if any concerns  -EN    Anticipated Discharge Disposition (SLP)  inpatient rehabilitation facility  -EN  inpatient rehabilitation facility  -EN      User Key  (r) = Recorded By, (t) = Taken By, (c) = Cosigned By    Initials Name Effective Dates    Silva Arellano, CCC-SLP 06/16/21 -     EN Octavia Rossi MS, CFY-SLP 05/20/21 -           EDUCATION  The patient has been educated in the following areas:   Dysphagia (Swallowing Impairment) Modified Diet Instruction.       SLP GOALS     Row Name 09/13/21 1015 09/12/21 1400          Oral Nutrition/Hydration Goal 1 (SLP)    Oral Nutrition/Hydration Goal 1, SLP  LTG: Pt will return to regular diet and thin liquids w/o s/s of aspiration during 100% of trials w/o cues  -EN  --     Time Frame (Oral Nutrition/Hydration Goal 1, SLP)  by discharge  -EN  --        Oral Nutrition/Hydration Goal 2 (SLP)    Oral Nutrition/Hydration Goal 2, SLP  Pt will tolerate therapeutic trials of ice/thin H2O and regular solids w/o s/s of aspiration in 60% of trials and no cues in order to demonstrate readiness for repeat instrumental evaluation.  -EN  --     Time Frame (Oral Nutrition/Hydration Goal 2, SLP)  short term goal (STG)  -EN  --        Oral Nutrition/Hydration Goal (SLP)    Oral Nutrition/Hydration Goal, SLP  Pt will tolerate nectar-thick liquids and soft solids w/o s/s of aspiration or distress w/ 100% acc and no cues.  -EN  --     Time Frame (Oral Nutrition/Hydration Goal, SLP)  short term goal (STG)  -EN  --        Lingual Strengthening Goal 1 (SLP)    Activity (Lingual Strengthening Goal 1, SLP)  increase lingual tone/sensation/control/coordination/movement  -EN  --      Increase Lingual Tone/Sensation/Control/Coordination/Movement  lingual resistance exercises  -EN  --     [place]/Accuracy (Lingual Strengthening Goal 1, SLP)  independently (over 90% accuracy)  -EN  --     Time Frame (Lingual Strengthening Goal 1, SLP)  by discharge  -EN  --        Pharyngeal Strengthening Exercise Goal 1 (SLP)    Activity (Pharyngeal Strengthening Goal 1, SLP)  increase timing;increase superior movement of the hyolaryngeal complex;increase anterior movement of the hyolaryngeal complex;increase closure at entrance to airway/closure of airway at glottis;increase squeeze/positive pressure generation  -EN  --     Increase Timing  prepping - 3 second prep or suck swallow or 3-step swallow  -EN  --     Increase Superior Movement of the Hyolaryngeal Complex  effortful pitch glide (falsetto + pharyngeal squeeze)  -EN  --     Increase Anterior Movement of the Hyolaryngeal Complex  chin tuck against resistance (CTAR)  -EN  --     Increase Closure at Entrance to Airway/Closure of Airway at Glottis  supraglottic swallow  -EN  --     Increase Squeeze/Positive Pressure Generation  hard effortful swallow  -EN  --     [place]/Accuracy (Pharyngeal Strengthening Goal 1, SLP)  independently (over 90% accuracy)  -EN  --     Time Frame (Pharyngeal Strengthening Goal 1, SLP)  short term goal (STG)  -EN  --        Swallow Compensatory Strategies Goal 1 (SLP)    Activity (Swallow Compensatory Strategies/Techniques Goal 1, SLP)  compensatory strategies;during meal intake;during p.o. trials;extra swallow per bolus;other (see comments) intermittently  -EN  --     [place]/Accuracy (Swallow Compensatory Strategies/Techniques Goal 1, SLP)  with minimal cues (75-90% accuracy)  -EN  --     Time Frame (Swallow Compensatory Strategies/Techniques Goal 1, SLP)  short term goal (STG)  -EN  --        Word Retrieval Skills Goal 1 (SLP)    Improve Word Retrieval Skills By Goal 1 (SLP)  completing automatic speech task,  counting;completing automatic speech task, alphabet;completing automatic speech task, days of the week;completing automatic speech task, months;confrontational naming task;repeating words;repeating phrases;70%;with moderate cues (50-74%)  -EN  completing automatic speech task, counting;completing automatic speech task, alphabet;completing automatic speech task, days of the week;completing automatic speech task, months;confrontational naming task;repeating words;repeating phrases;70%;with moderate cues (50-74%)  -ML     Time Frame (Word Retrieval Goal 1, SLP)  short term goal (STG);by discharge  -EN  short term goal (STG);by discharge  -ML     Barriers (Word Retrieval Goal 1, SLP)  --  Apraxia  -ML     Progress (Word Retrieval Skills Goal 1, SLP)  60%;with minimal cues (75-90%)  -EN  --     Progress/Outcomes (Word Retrieval Goal 1, SLP)  continuing progress toward goal  -EN  --        Graphic Expression of Shapes, Letters, Numbers Goal (SLP)    Improve Graphic Expression of Shapes, Letters, and Numbers Goal (SLP)  Probe writing when condition warrants  -EN  Probe writing when condition warrants  -ML     Time Frame (Graphic Expression of Shapes, Letters, and Numbers Goal, SLP)  by discharge  -EN  by discharge  -ML     Progress/Outcomes (Graphic Expression of Shapes, Letters, and Numbers Goal, SLP)  goal ongoing  -EN  --        Articulation Goal 1 (SLP)    Improve Articulation Goal 1 (SLP)  by over-articulating at word level;90%;with moderate cues (50-74%)  -EN  by over-articulating at word level;90%;with moderate cues (50-74%)  -ML     Time Frame (Articulation Goal 1, SLP)  short term goal (STG);by discharge  -EN  short term goal (STG);by discharge  -ML     Progress (Articulation Goal 1, SLP)  50%;with minimal cues (75-90%)  -EN  --     Progress/Outcomes (Articulation Goal 1, SLP)  continuing progress toward goal  -EN  --        Planning and Execution of Connected Speech Goal 1 (SLP)    Improve Planning and Execution  of Connected Speech by Goal 1 (SLP)  imitating one syllable words;imitating two syllable words;completing open-ended sentences;repeating phrases;90%;with moderate cues (50-74%)  -EN  imitating one syllable words;imitating two syllable words;completing open-ended sentences;repeating phrases;90%;with moderate cues (50-74%)  -ML     Time Frame (Planning and Execution of Connected Speech Goal 1, SLP)  short term goal (STG);by discharge  -EN  short term goal (STG);by discharge  -ML     Progress (Planning and Execution of Connected Speech Goal 1, SLP)  60%;with minimal cues (75-90%)  -EN  --     Progress/Outcomes (Planning and Execution of Connected Speech Goal 1, SLP)  continuing progress toward goal  -EN  --        Additional Goal (SLP)    Additional Goal, SLP  LTG: Improve verbal communication  -EN  LTG: Improve verbal communication  -ML     Time Frame (Additional Goal, SLP)  by discharge  -EN  by discharge  -ML     Progress/Outcomes (Additional Goal, SLP)  continuing progress toward goal  -EN  --       User Key  (r) = Recorded By, (t) = Taken By, (c) = Cosigned By    Initials Name Provider Type     Silva Hooks, CCC-SLP Speech and Language Pathologist    EN Octavia Rossi MS, CFY-SLP Speech and Language Pathologist             Time Calculation:   Time Calculation- SLP     Row Name 09/13/21 1234 09/13/21 0941          Time Calculation- SLP    SLP Start Time  1015  -EN  0845  -EN     SLP Received On  09/13/21  -EN  09/13/21  -EN        Untimed Charges    SLP Eval/Re-eval   ST Fiberoptic Endo Eval Swallow - 44163  -EN  ST Eval Oral Pharyng Swallow - 08698  -EN     66439-ZM Eval Oral Pharyng Swallow Minutes  --  38  -EN     42987-QZ Fiberoptic Endo Eval Swallow Minutes  83  -EN  --     62504-UB Treatment/ST Modification Prosth Aug Alter   15  -EN  --        Total Minutes    Untimed Charges Total Minutes  98  -EN  38  -EN      Total Minutes  98  -EN  38  -EN       User Key  (r) = Recorded By, (t) = Taken By,  (c) = Cosigned By    Initials Name Provider Type    EN [place] Octavia TORRES MS, CFY-SLP Speech and Language Pathologist          Therapy Charges for Today     Code Description Service Date Service Provider Modifiers Qty    56348948045 HC ST EVAL ORAL PHARYNG SWALLOW 3 9/13/2021 [place] Octavia TORRES MS, CFY-SLP GN 1    70178490674 HC ST FIBEROPTIC ENDO EVAL SWALL 6 9/13/2021 Octavia Rossi MS, CFDONAL-SLP GN 1    60720402682 HC ST TREATMENT SPEECH 1 9/13/2021 Octavia Rossi MS, CFY-SLP GN 1            Patient was not wearing a face mask and did exhibit coughing during this therapy encounter.  Procedure performed was aerosolizing, involved close contact (within 6 feet for at least 15 minutes or longer), and did not involve contact with infectious secretions or specimens.  Therapist used appropriate personal protective equipment including gloves, standard procedure mask, eye protection and N95 mask.  Appropriate PPE was worn during the entire therapy session.  Hand hygiene was completed before and after therapy session.       Octavia Rossi MS, DAVONTE-SLP  9/13/2021

## 2021-09-13 NOTE — PROGRESS NOTES
Clinical Nutrition     Multidisciplinary Rounds      Patient Name: Sissy Ballard  Date of Encounter: 09/13/21 09:42 EDT  MRN: [id number]  Admission date: 9/12/2021      Reason for visit: MDR.     Pertinent information obtained via EMR and during MDR: RD notes plans for FEES today. NIH per RN 3. HgbA1C noted.    Current diet: NPO Diet  No active supplement orders      EMR reviewed   Labs reviewed  Results from last 7 days   Lab Units 09/13/21  0526 09/12/21  2334 09/12/21  1731 09/12/21  1154   GLUCOSE mg/dL 132* 104 257* 262*     Lab Results   Lab Value Date/Time    HGBA1C 10.10 (H) 09/13/2021 0433       Intervention:  Follow treatment plan  Care plan reviewed  RD to offer nutrition education as clinically appropriate     Follow up:   Per protocol      Darleen Salcedo RDN, LD, CNSC  09:42 EDT  Time: 10min

## 2021-09-13 NOTE — PROGRESS NOTES
Follow-up CT/MRI demonstrates small evolving infarct in the left frontal/temporal region, but majority of ischemic penumbra appear salvaged with thrombectomy.  Clinically is doing better with improvement in speech.  Patient had been noncompliant with anticoagulation (could not get prescription refilled), and thus was not a failure of anticoagulation.  Okay to resume Eliquis and/or alternative anticoagulation, from a neurointerventional standpoint.  Greatly appreciate Neurology's input, and will defer to management of stroke to Neurology service.

## 2021-09-14 LAB
GLUCOSE BLDC GLUCOMTR-MCNC: 228 MG/DL (ref 70–130)
GLUCOSE BLDC GLUCOMTR-MCNC: 351 MG/DL (ref 70–130)
GLUCOSE BLDC GLUCOMTR-MCNC: 367 MG/DL (ref 70–130)
GLUCOSE BLDC GLUCOMTR-MCNC: 376 MG/DL (ref 70–130)
GLUCOSE BLDC GLUCOMTR-MCNC: 409 MG/DL (ref 70–130)
GLUCOSE BLDC GLUCOMTR-MCNC: 413 MG/DL (ref 70–130)
GLUCOSE BLDC GLUCOMTR-MCNC: 431 MG/DL (ref 70–130)

## 2021-09-14 PROCEDURE — 63710000001 INSULIN DETEMIR PER 5 UNITS: Performed by: INTERNAL MEDICINE

## 2021-09-14 PROCEDURE — 82962 GLUCOSE BLOOD TEST: CPT

## 2021-09-14 PROCEDURE — 63710000001 INSULIN LISPRO (HUMAN) PER 5 UNITS: Performed by: INTERNAL MEDICINE

## 2021-09-14 PROCEDURE — 99232 SBSQ HOSP IP/OBS MODERATE 35: CPT | Performed by: CLINICAL NURSE SPECIALIST

## 2021-09-14 PROCEDURE — 25010000002 CEFTRIAXONE PER 250 MG: Performed by: INTERNAL MEDICINE

## 2021-09-14 PROCEDURE — 99232 SBSQ HOSP IP/OBS MODERATE 35: CPT | Performed by: INTERNAL MEDICINE

## 2021-09-14 PROCEDURE — 63710000001 INSULIN LISPRO (HUMAN) PER 5 UNITS: Performed by: NURSE PRACTITIONER

## 2021-09-14 RX ORDER — ONDANSETRON 2 MG/ML
4 INJECTION INTRAMUSCULAR; INTRAVENOUS EVERY 6 HOURS PRN
Status: DISCONTINUED | OUTPATIENT
Start: 2021-09-14 | End: 2021-09-17 | Stop reason: HOSPADM

## 2021-09-14 RX ORDER — HYDRALAZINE HYDROCHLORIDE 20 MG/ML
10 INJECTION INTRAMUSCULAR; INTRAVENOUS EVERY 6 HOURS PRN
Status: DISCONTINUED | OUTPATIENT
Start: 2021-09-14 | End: 2021-09-17 | Stop reason: HOSPADM

## 2021-09-14 RX ADMIN — INSULIN DETEMIR 20 UNITS: 100 INJECTION, SOLUTION SUBCUTANEOUS at 10:02

## 2021-09-14 RX ADMIN — INSULIN LISPRO 5 UNITS: 100 INJECTION, SOLUTION INTRAVENOUS; SUBCUTANEOUS at 12:36

## 2021-09-14 RX ADMIN — SODIUM CHLORIDE, PRESERVATIVE FREE 10 ML: 5 INJECTION INTRAVENOUS at 08:22

## 2021-09-14 RX ADMIN — INSULIN LISPRO 12 UNITS: 100 INJECTION, SOLUTION INTRAVENOUS; SUBCUTANEOUS at 17:54

## 2021-09-14 RX ADMIN — SODIUM CHLORIDE 1 G: 900 INJECTION INTRAVENOUS at 12:36

## 2021-09-14 RX ADMIN — LEVOTHYROXINE SODIUM 50 MCG: 50 TABLET ORAL at 06:12

## 2021-09-14 RX ADMIN — PANTOPRAZOLE SODIUM 40 MG: 40 TABLET, DELAYED RELEASE ORAL at 06:12

## 2021-09-14 RX ADMIN — ATORVASTATIN CALCIUM 80 MG: 40 TABLET, FILM COATED ORAL at 20:46

## 2021-09-14 RX ADMIN — SODIUM CHLORIDE, PRESERVATIVE FREE 10 ML: 5 INJECTION INTRAVENOUS at 20:46

## 2021-09-14 RX ADMIN — APIXABAN 2.5 MG: 2.5 TABLET, FILM COATED ORAL at 20:46

## 2021-09-14 RX ADMIN — APIXABAN 5 MG: 5 TABLET, FILM COATED ORAL at 08:22

## 2021-09-14 RX ADMIN — INSULIN LISPRO 12 UNITS: 100 INJECTION, SOLUTION INTRAVENOUS; SUBCUTANEOUS at 12:36

## 2021-09-14 RX ADMIN — INSULIN LISPRO 12 UNITS: 100 INJECTION, SOLUTION INTRAVENOUS; SUBCUTANEOUS at 20:45

## 2021-09-14 RX ADMIN — ASPIRIN 81 MG CHEWABLE TABLET 81 MG: 81 TABLET CHEWABLE at 08:22

## 2021-09-14 RX ADMIN — METOPROLOL TARTRATE 25 MG: 25 TABLET, FILM COATED ORAL at 10:01

## 2021-09-14 RX ADMIN — INSULIN LISPRO 8 UNITS: 100 INJECTION, SOLUTION INTRAVENOUS; SUBCUTANEOUS at 17:55

## 2021-09-14 RX ADMIN — INSULIN LISPRO 4 UNITS: 100 INJECTION, SOLUTION INTRAVENOUS; SUBCUTANEOUS at 08:22

## 2021-09-14 RX ADMIN — METOPROLOL TARTRATE 25 MG: 25 TABLET, FILM COATED ORAL at 20:46

## 2021-09-14 NOTE — PROGRESS NOTES
Stroke Progress Note       Chief Complaint: aphasia and right facial droop    Subjective    Subjective     Subjective:  No acute events overnight.  Patient's speech has significantly improved but continues to have some mild dysarthria. Patient is unsure of her dose of Eliquis at home.     Review of Systems   Constitutional: Negative.    HENT: Positive for trouble swallowing.    Eyes: Negative.  Negative for visual disturbance.   Respiratory: Negative.    Cardiovascular: Negative.    Gastrointestinal: Positive for diarrhea, nausea and vomiting.   Genitourinary: Negative.  Negative for flank pain.   Musculoskeletal: Positive for arthralgias (Chronic) and back pain (Chronic).   Skin: Negative.    Neurological: Positive for facial asymmetry and speech difficulty. Negative for dizziness, weakness, numbness and headaches.   Hematological: Bruises/bleeds easily.   Psychiatric/Behavioral: Negative.           Objective    Objective      Temp:  [97.7 °F (36.5 °C)-99.1 °F (37.3 °C)] 98 °F (36.7 °C)  Heart Rate:  [] 89  Resp:  [14-18] 16  BP: (104-142)/(49-88) 111/54    Neurological Exam  Mental Status  Awake, alert and oriented to person, place and time.Alert. Mild dysarthria present. Language is fluent with no aphasia. Attention and concentration are normal.    Cranial Nerves  CN II: Visual fields full to confrontation.  CN III, IV, VI: Extraocular movements intact bilaterally. Pupils equal round and reactive to light bilaterally.  CN V: Facial sensation is normal.  CN VII:  Right: There is central facial weakness. Slight.  CN VIII: Hearing intact bilaterally.  CN IX, X: Palate elevates symmetrically  CN XI: Shoulder shrug strength is normal.  CN XII: Tongue midline without atrophy or fasciculations.    Motor  Normal muscle bulk throughout. No fasciculations present. Normal muscle tone. Strength is 5/5 throughout all four extremities.    Sensory  Light touch is normal in upper and lower extremities.     Coordination  No  dysmetria.    Gait  Not assessed.      Physical Exam  Vitals and nursing note reviewed.   Constitutional:       Appearance: Normal appearance.   HENT:      Head: Normocephalic and atraumatic.      Mouth/Throat:      Mouth: Mucous membranes are moist.      Pharynx: Oropharynx is clear.   Eyes:      Extraocular Movements: Extraocular movements intact.      Pupils: Pupils are equal, round, and reactive to light.   Cardiovascular:      Rate and Rhythm: Normal rate and regular rhythm.      Pulses: Normal pulses.   Pulmonary:      Effort: Pulmonary effort is normal.      Comments: On room air  Genitourinary:     Comments: External catheter in place  Skin:     General: Skin is warm and dry.      Comments: Right femoral groin puncture site with intact dressing, no drainage, soft with no hematoma   Neurological:      Mental Status: She is alert and oriented to person, place, and time.      Cranial Nerves: Cranial nerve deficit and dysarthria present.      Sensory: No sensory deficit.      Motor: No weakness.      Coordination: Coordination normal.      Deep Tendon Reflexes: Strength normal.   Psychiatric:         Mood and Affect: Mood normal.         Behavior: Behavior normal.         Results Review:    I reviewed the patient's new clinical results.    CTA head/neck reveals a left M2 occlusion.     CT perfusion reveals M2 occlusion, small core infarct with moderate amount of ischemic penumbra.    MRI brain without contrast reveals small evolving left frontal/temporal infarct, with no evidence of hemorrhage.    Dual-energy CT scan on 9/13 is negative for hemorrhage also demonstrates evolving left frontal/temporal infarct.    A1c 10.10  Total cholesterol 262, triglycerides 222,   Glucose 228  Urinalysis with large +3 leuk esterase and positive nitrate, 4+ bacteria  EKG with normal sinus rhythm, left bundle branch block    Results for orders placed during the hospital encounter of 09/12/21    Adult Transthoracic Echo  Complete W/ Cont if Necessary Per Protocol (With Agitated Saline)    Interpretation Summary  · Saline test results are negative.  · Estimated right ventricular systolic pressure from tricuspid regurgitation is normal (<35 mmHg).  · The following left ventricular wall segments are hypokinetic: basal anterolateral, basal inferolateral, mid inferolateral, apical inferior, mid inferior, basal inferoseptal, mid inferoseptal, basal anterior, basal inferior and basal inferoseptal. The following left ventricular wall segments are akinetic: mid anterior, apical anterior, mid anterolateral, apical lateral, apical septal, apex and mid anteroseptal.  · Estimated left ventricular EF = 40% Estimated left ventricular EF was in agreement with the calculated left ventricular EF. Left ventricular ejection fraction appears to be 36 - 40%. Left ventricular systolic function is mildly decreased.  · Left ventricular diastolic function is consistent with (grade II w/high LAP) pseudonormalization.  · Normal right ventricular cavity size, wall thickness, systolic function and septal motion noted.  · The aortic valve exhibits mild sclerosis.  · No evidence of pulmonary hypertension is present.  · There is no evidence of pericardial effusion.  · No evidence of a patent foramen ovale.        Assessment/Plan     Assessment/Plan:    This is an 85-year-old right-handed  female with known medical diagnosis of atrial fibrillation (on chronic Eliquis) who presented to the emergency department today for further evaluation of right-sided weakness, facial droop, and expressive aphasia. CT of the head was negative for hemorrhage and/or acute process however due to LKW >4.5 hours.  CTA of the head/neck and CT perfusion scan were performed which revealed a left M2 occlusion therefore she was taken to the cath lab for a mechanical thrombectomy.        1.  Acute right M2 ischemic stroke, etiology cardioembolic  -TIA/CVA order set without  thrombolytic therapy is in place  -MRI of the brain without contrast reveals evolving left frontal/temporal stroke, small amount of hemorrhagic conversion present on GRE sequence. These results were discussed yesterday with neuro interventionalist, Dr. Dean who has reviewed imaging and is OK with resuming patient's home dose of Eliquis.    -2D echocardiogram with normal left atrium size, no interseptal aneurysm, and no evidence of PFO.  EF 40%  -PT/OT recommend home with assist or home health.  -SLP has evaluated patient and recommends FEES  -ASA 81 mg oral or 300 mg rectal today and daily     2.  Chronic atrial fibrillation  -Patient on Eliquis prior to arrival.  Initially the patient was thought to be compliant with medications however upon discussion today the patient states she has not taken her Eliquis since 9/5 due to pharmacy being unable to refill her prescription.   -CT of the head without contrast this morning was negative for hemorrhage  -Okay to resume patient's Eliquis 2.5 mg daily, discussed with pharmacist today who did research previous dose of Eliquis. Agree that with the patient's age and borderline kidney function that Eliquis 2.5 twice daily is appropriate.  -Rate control per Intensivist    3.  Diabetes Mellitus type 2, uncontrolled  -Management per Intensivist  -Maintain euglycemia  -A1c 10.10, we will have diabetes educator meet with the patient  -Patient states that her diabetes has been managed by her primary care physician however she has seen an endocrinologist recently.        4.  Hypertension  -Okay to resume patient's home blood pressure medications  -Goal pressure 130/80    5.  Hyperlipidemia  -Patient on atorvastatin 10 mg at home, will increase to 80 mg nightly to meet high intensity statin requirements  -LDL on this admission was 170    6.  Urinary tract infection  -Large +3 leuk esterase and positive nitrate, 4+ bacteria  -Management per Intensivist, patient has been started on  ceftriaxone     Discussed plan of care with the patient and pharmacist. Okay to transfer patient to telemetry. No further stroke work-up is needed, will sign off. Please call with any questions.       TAYLOR Ngo  09/14/21  10:58 EDT

## 2021-09-14 NOTE — PROGRESS NOTES
"Critical Care Note     LOS: 2 days   Patient Care Team:  Paris Gibbs APRN as PCP - General (Family Medicine)    Chief Complaint/Reason for visit:    Chief Complaint   Patient presents with   • Stroke       Subjective     84-year-old woman with diabetes, hypertension, atrial fibrillation, congestive heart failure, coronary artery disease, hypothyroidism, chronic kidney disease presenting with acute CVA, expressive aphasia and right-sided weakness.  She underwent thrombectomy with improvement in her NIH stroke score from a 10 to an 8.  Her Eliquis was apparently stopped in July.  Her baseline serum creatinine is 1.46.  Speech therapy cleared her for a dysphagia 4 diet.  She ambulated 150 feet with a rolling walker but was noted to have poor bilateral lower extremity coordination.  She had generalized weakness, decreased functional endurance and poor balance.    Interval History:     NIH stroke score remains a 3.  She remains in sinus rhythm with a rate of 70-90.  Room air saturation is 92 to 98%.  She is voiding without a Balderrama catheter.  Her serum creatinine yesterday was 1.38    Review of Systems:    All systems were reviewed and negative except as noted in subjective.    Medical history, surgical history, social history, family history reviewed    Objective     Intake/Output:    Intake/Output Summary (Last 24 hours) at 9/14/2021 1251  Last data filed at 9/14/2021 1200  Gross per 24 hour   Intake 1347 ml   Output 300 ml   Net 1047 ml       Nutrition:  Diet Dysphagia; IV - Mechanical Soft No Mixed Consistencies; Nectar / Syrup Thick; Cardiac, Consistent Carbohydrate    Infusions:  niCARdipine, 5-15 mg/hr, Last Rate: Stopped (09/13/21 1302)          Telemetry: Sinus rhythm             Vital Signs  Blood pressure 115/63, pulse 80, temperature 98.2 °F (36.8 °C), temperature source Oral, resp. rate 16, height 149.9 cm (59\"), weight 68.3 kg (150 lb 9.2 oz), SpO2 92 %.    Physical Exam:  General Appearance:   Pleasant " older woman in no distress   Head:   Normal cephalic, atraumatic   Eyes:          Pupils equal and reactive to light.  No jaundice.  Conjunctiva pink   Ears:     Throat:  Oral mucosa moist   Neck:  Trachea midline, no palpable thyroid   Back:      Lungs:    Symmetric chest expansion.  Breath sounds are bilateral without wheeze    Heart:   Regular rhythm, S1, S2 auscultated   Abdomen:    Bowel sounds present, soft, nontender   Rectal:   Deferred   Extremities:  No pretibial edema or cyanosis.  Cath site without hematoma   Pulses:  Pedal pulses present   Skin:  Warm and dry, no rash   Lymph nodes:    Neurologic:  Alert with some dysarthria and mild right facial droop.  No motor drift upper or lower extremities      Results Review:     I reviewed the patient's new clinical results.   Results from last 7 days   Lab Units 09/13/21  0433 09/12/21  1212 09/12/21  1154   SODIUM mmol/L 140  --   --    POTASSIUM mmol/L 4.3  --   --    CHLORIDE mmol/L 101  --   --    CO2 mmol/L 25.0  --   --    BUN mg/dL 31*  --   --    CREATININE mg/dL 1.38*  --  1.50*   CALCIUM mg/dL 9.7  --   --    ALT (SGPT) U/L  --  12  --    AST (SGOT) U/L  --  17  --    GLUCOSE mg/dL 75  --   --      Results from last 7 days   Lab Units 09/12/21  1212 09/12/21  1154   WBC 10*3/mm3 8.31  --    HEMOGLOBIN g/dL 11.6*  --    HEMOGLOBIN, POC g/dL  --  14.3   HEMATOCRIT % 35.3  --    HEMATOCRIT POC %  --  42   PLATELETS 10*3/mm3 194  --      Results from last 7 days   Lab Units 09/12/21  1154   PH, ARTERIAL pH units 7.45     No results found for: BLOODCX  Lab Results   Component Value Date    URINECX >100,000 CFU/mL Escherichia coli (A) 09/13/2021       I reviewed the patient's new imaging including images and reports.       FINDINGS: MRI     Patchy restricted diffusion involving the left frontal lobe and a portion of the left insula is consistent with reported history of left M2 occlusion. Overall involved area is maximally measuring 3.1 cm.     No  additional sites of restricted diffusion to suggest additional vascular territory infarct. No hydrocephalus. Rounded intermediate T2 intensity along the left anterior temporal pole correlates with homogeneously enhancing focus on the CTA of the head  and neck and most likely represents a small meningioma measuring up to 1.1 cm.     There are scattered T2/FLAIR signal hyperintensities within the bilateral cerebral and deep white matter which are nonspecific but most commonly associated with chronic microvascular ischemic change.     Midline structures are within normal limits.     Normal calvarial marrow signal. Mucosal thickening within the right maxillary antrum and patchy fluid in the right mastoid air cells.        IMPRESSION:  1.  Findings of left frontal/insular acute/subacute infarct, consistent with history of left M2 occlusion.  2.  Probable small meningioma along the left anterior temporal pole.     Signer Name: CHAU GHOTRA MD   Signed: 9/12/2021 9:29 PM    All medications reviewed.   apixaban, 2.5 mg, Oral, Q12H  aspirin, 81 mg, Oral, Daily   Or  aspirin, 300 mg, Rectal, Daily  atorvastatin, 80 mg, Oral, Nightly  cefTRIAXone, 1 g, Intravenous, Q24H  insulin detemir, 20 Units, Subcutaneous, Daily  insulin lispro, 0-14 Units, Subcutaneous, 4x Daily With Meals & Nightly  insulin lispro, 5 Units, Subcutaneous, TID With Meals  levothyroxine, 50 mcg, Oral, Q AM  metoprolol tartrate, 25 mg, Oral, Q12H  pantoprazole, 40 mg, Oral, Q AM  sodium chloride, 10 mL, Intravenous, Q12H          Assessment/Plan       Ischemic cerebrovascular accident (CVA) (CMS/HCC)    Essential hypertension    Type 2 diabetes mellitus (CMS/HCC)    Longstanding persistent atrial fibrillation (CMS/HCC)    Stage 3b chronic kidney disease (CMS/HCC)    Hypothyroidism (acquired)    #1 acute ischemic stroke status post thrombectomy with significant improvement.  Today her NIH stroke scale is a 3.  She was evaluated by speech and underwent a fees  and a dysphagia 4 diet has been ordered.  Presumed source is cardioembolic because her Eliquis was stopped.  Apparently she could not get it refilled on September 5.  It has been restarted at 2.5 mg twice daily    #2 history of atrial fibrillation with possible tachybradycardia syndrome.  Echocardiogram revealed some hypokinetic wall motion, and EF reduced at 36 to 40% with a sclerotic aortic valve and no PFO.  She is currently in sinus rhythm    #3 diabetes mellitus type 2, poorly controlled with an A1c of 10.  She apparently uses Levemir at home.  Glucoses are running 230-430, just on sliding scale    #4 hypothyroidism on replacement therapy    #6 chronic kidney disease stage IIIb serum creatinine is stable. She did receive some contrast and we will need to watch for worsening function.    #7 urinalysis with 4+ bacteria and many white cells consistent with a probable urinary tract infection.  Rocephin started September 13    #8 hypertension, she has been off Cardene drip since yesterday afternoon.  Oral metoprolol restarted.  She also took lisinopril 5 mg as an outpatient but her systolic is 104-125 so I will not restart lisinopril      PLAN:    Sliding scale insulin  Add meal coverage and Levemir  Diabetes educator      Systolic pressure less than 185  Continue metoprolol  Hold lisinopril for now and monitor renal function    Continue thyroid replacement    Monitor NIH stroke scale    Monitor rhythm    Restart Eliquis, 2.5 mg twice daily    Aspirin, statin    Continue Rocephin x3 doses for urinary tract infection, this is day 2    VTE Prophylaxis: anticoagulated    Stress Ulcer Prophylaxis: Protonix    Patient discussed in multidisciplinary rounds    Maite Ulloa MD  09/14/21  12:51 EDT      Time: 30 minutes

## 2021-09-14 NOTE — NURSING NOTE
NIH at the start of the shift still a 3.  She is oriented times 4. Vitals have been stable and she has had adequate urinary output.  Will continue to monitor.

## 2021-09-14 NOTE — PLAN OF CARE
Goal Outcome Evaluation:  Plan of Care Reviewed With: patient        Progress: improving      Outcome Summary: NIH 4 this am. Pt A&OX4, SUE, right droop, garbled speech. Right eye blurred vision - baseline. Room air, 2L NC occassionally @ night. VSS. Afebrile. Pt. updated on plan of care.

## 2021-09-15 LAB
ANION GAP SERPL CALCULATED.3IONS-SCNC: 11 MMOL/L (ref 5–15)
BACTERIA SPEC AEROBE CULT: ABNORMAL
BACTERIA SPEC AEROBE CULT: ABNORMAL
BUN SERPL-MCNC: 37 MG/DL (ref 8–23)
BUN/CREAT SERPL: 21.6 (ref 7–25)
CALCIUM SPEC-SCNC: 9 MG/DL (ref 8.6–10.5)
CHLORIDE SERPL-SCNC: 101 MMOL/L (ref 98–107)
CO2 SERPL-SCNC: 25 MMOL/L (ref 22–29)
CREAT SERPL-MCNC: 1.71 MG/DL (ref 0.57–1)
DEPRECATED RDW RBC AUTO: 49.1 FL (ref 37–54)
ERYTHROCYTE [DISTWIDTH] IN BLOOD BY AUTOMATED COUNT: 16.3 % (ref 12.3–15.4)
GFR SERPL CREATININE-BSD FRML MDRD: 29 ML/MIN/1.73
GLUCOSE BLDC GLUCOMTR-MCNC: 183 MG/DL (ref 70–130)
GLUCOSE BLDC GLUCOMTR-MCNC: 275 MG/DL (ref 70–130)
GLUCOSE BLDC GLUCOMTR-MCNC: 292 MG/DL (ref 70–130)
GLUCOSE BLDC GLUCOMTR-MCNC: 296 MG/DL (ref 70–130)
GLUCOSE SERPL-MCNC: 211 MG/DL (ref 65–99)
HCT VFR BLD AUTO: 32.7 % (ref 34–46.6)
HGB BLD-MCNC: 11 G/DL (ref 12–15.9)
MAGNESIUM SERPL-MCNC: 2 MG/DL (ref 1.6–2.4)
MCH RBC QN AUTO: 28 PG (ref 26.6–33)
MCHC RBC AUTO-ENTMCNC: 33.6 G/DL (ref 31.5–35.7)
MCV RBC AUTO: 83.2 FL (ref 79–97)
PLATELET # BLD AUTO: 206 10*3/MM3 (ref 140–450)
PMV BLD AUTO: 11 FL (ref 6–12)
POTASSIUM SERPL-SCNC: 4.5 MMOL/L (ref 3.5–5.2)
RBC # BLD AUTO: 3.93 10*6/MM3 (ref 3.77–5.28)
SODIUM SERPL-SCNC: 137 MMOL/L (ref 136–145)
WBC # BLD AUTO: 9.31 10*3/MM3 (ref 3.4–10.8)

## 2021-09-15 PROCEDURE — 97535 SELF CARE MNGMENT TRAINING: CPT

## 2021-09-15 PROCEDURE — 99232 SBSQ HOSP IP/OBS MODERATE 35: CPT | Performed by: INTERNAL MEDICINE

## 2021-09-15 PROCEDURE — 82962 GLUCOSE BLOOD TEST: CPT

## 2021-09-15 PROCEDURE — 63710000001 INSULIN LISPRO (HUMAN) PER 5 UNITS: Performed by: INTERNAL MEDICINE

## 2021-09-15 PROCEDURE — 83735 ASSAY OF MAGNESIUM: CPT | Performed by: INTERNAL MEDICINE

## 2021-09-15 PROCEDURE — 92507 TX SP LANG VOICE COMM INDIV: CPT

## 2021-09-15 PROCEDURE — 63710000001 INSULIN DETEMIR PER 5 UNITS: Performed by: INTERNAL MEDICINE

## 2021-09-15 PROCEDURE — 85027 COMPLETE CBC AUTOMATED: CPT | Performed by: INTERNAL MEDICINE

## 2021-09-15 PROCEDURE — 80048 BASIC METABOLIC PNL TOTAL CA: CPT | Performed by: INTERNAL MEDICINE

## 2021-09-15 PROCEDURE — 25010000002 CEFTRIAXONE PER 250 MG: Performed by: INTERNAL MEDICINE

## 2021-09-15 PROCEDURE — 92526 ORAL FUNCTION THERAPY: CPT

## 2021-09-15 PROCEDURE — 63710000001 INSULIN LISPRO (HUMAN) PER 5 UNITS: Performed by: NURSE PRACTITIONER

## 2021-09-15 RX ORDER — METOPROLOL TARTRATE 5 MG/5ML
5 INJECTION INTRAVENOUS EVERY 6 HOURS PRN
Status: DISCONTINUED | OUTPATIENT
Start: 2021-09-15 | End: 2021-09-17 | Stop reason: HOSPADM

## 2021-09-15 RX ADMIN — PANTOPRAZOLE SODIUM 40 MG: 40 TABLET, DELAYED RELEASE ORAL at 05:00

## 2021-09-15 RX ADMIN — LEVOTHYROXINE SODIUM 50 MCG: 50 TABLET ORAL at 05:00

## 2021-09-15 RX ADMIN — INSULIN LISPRO 3 UNITS: 100 INJECTION, SOLUTION INTRAVENOUS; SUBCUTANEOUS at 08:06

## 2021-09-15 RX ADMIN — SODIUM CHLORIDE, PRESERVATIVE FREE 10 ML: 5 INJECTION INTRAVENOUS at 08:02

## 2021-09-15 RX ADMIN — METOPROLOL TARTRATE 25 MG: 25 TABLET, FILM COATED ORAL at 20:14

## 2021-09-15 RX ADMIN — METOPROLOL TARTRATE 25 MG: 25 TABLET, FILM COATED ORAL at 08:06

## 2021-09-15 RX ADMIN — INSULIN LISPRO 8 UNITS: 100 INJECTION, SOLUTION INTRAVENOUS; SUBCUTANEOUS at 11:48

## 2021-09-15 RX ADMIN — INSULIN LISPRO 8 UNITS: 100 INJECTION, SOLUTION INTRAVENOUS; SUBCUTANEOUS at 17:45

## 2021-09-15 RX ADMIN — SODIUM CHLORIDE, PRESERVATIVE FREE 10 ML: 5 INJECTION INTRAVENOUS at 20:14

## 2021-09-15 RX ADMIN — INSULIN LISPRO 8 UNITS: 100 INJECTION, SOLUTION INTRAVENOUS; SUBCUTANEOUS at 08:06

## 2021-09-15 RX ADMIN — SODIUM CHLORIDE 1 G: 900 INJECTION INTRAVENOUS at 11:48

## 2021-09-15 RX ADMIN — INSULIN DETEMIR 20 UNITS: 100 INJECTION, SOLUTION SUBCUTANEOUS at 08:07

## 2021-09-15 RX ADMIN — ASPIRIN 81 MG CHEWABLE TABLET 81 MG: 81 TABLET CHEWABLE at 08:06

## 2021-09-15 RX ADMIN — APIXABAN 2.5 MG: 2.5 TABLET, FILM COATED ORAL at 08:06

## 2021-09-15 RX ADMIN — INSULIN LISPRO 8 UNITS: 100 INJECTION, SOLUTION INTRAVENOUS; SUBCUTANEOUS at 20:15

## 2021-09-15 RX ADMIN — ATORVASTATIN CALCIUM 80 MG: 40 TABLET, FILM COATED ORAL at 20:14

## 2021-09-15 RX ADMIN — APIXABAN 2.5 MG: 2.5 TABLET, FILM COATED ORAL at 20:14

## 2021-09-15 NOTE — PLAN OF CARE
Goal Outcome Evaluation:  Plan of Care Reviewed With: patient           Outcome Summary: Pt presents w/ generalized weakness, decreased activity tolerance, and mild balance deficits limiting her ADL independence. Pt gave good effort throughout skilled OT treatment session, VC's req for sequencing and safety throughout. Will continue to progress pt as tolerated per OT POC.

## 2021-09-15 NOTE — PLAN OF CARE
Goal Outcome Evaluation:         Transfer from ICU this afternoon. VSS. NIH 1 related to visual field cuts. Alert and oriented.

## 2021-09-15 NOTE — CASE MANAGEMENT/SOCIAL WORK
Continued Stay Note   Calvin     Patient Name: Sissy Ballard  MRN: [id number]  Today's Date: 9/15/2021    Admit Date: 9/12/2021    Discharge Plan     Row Name 09/15/21 4439       Plan    Plan  Home with family and HH    Patient/Family in Agreement with Plan  yes    Plan Comments  Pt to be moved to the floor. Spoke to her at  and she walked 150 ft with PT. Discussed inpatient rehab and pt states she doesn't want to go to inpatient rehab but would be agreeable to HH for therapy and has no preference of agency. CM can arrange HH closer to d/c. Family will transport home. CM will follow. Fely ext. [phone]        Discharge Codes    No documentation.       Expected Discharge Date and Time     Expected Discharge Date Expected Discharge Time    Sep 17, 2021             Fely Ballard RN

## 2021-09-15 NOTE — THERAPY TREATMENT NOTE
Patient Name: Sissy Ballard  : 1935    MRN: [id number]                              Today's Date: 9/15/2021       Admit Date: 2021    Visit Dx:     ICD-10-CM ICD-9-CM   1. Acute CVA (cerebrovascular accident) (CMS/HCC)  I63.9 434.91   2. Oropharyngeal dysphagia  R13.12 787.22   3. Cognitive communication deficit  R41.841 799.52     Patient Active Problem List   Diagnosis   • Ischemic cerebrovascular accident (CVA) (CMS/HCC)   • Essential hypertension   • Type 2 diabetes mellitus (CMS/HCC)   • Longstanding persistent atrial fibrillation (CMS/HCC)   • Stage 3b chronic kidney disease (CMS/HCC)   • Hypothyroidism (acquired)     Past Medical History:   Diagnosis Date   • Congestive heart failure (CHF) (CMS/[facility])    • Coronary artery disease     MI,  or    • Essential hypertension 2021   • Hypothyroid    • Hypothyroidism (acquired) 2021   • Nephrolithiasis    • Permanent atrial fibrillation (CMS/HCC) on Eliquis 2021   • Type 2 diabetes mellitus (CMS/HCC) 2021     Past Surgical History:   Procedure Laterality Date   • APPENDECTOMY      Performed at the time of her hysterectomy   • CARDIAC CATHETERIZATION       or  with stent she does not know details   • CATARACT EXTRACTION, BILATERAL     •  SECTION      X2   • INTERVENTIONAL RADIOLOGY PROCEDURE N/A 2021    Procedure: IR MECHANICAL THROMBECTOMY - PRIMARY;  Surgeon: Vaughn Dean MD;  Location: [facility] INVASIVE LOCATION;  Service: Interventional Radiology;  Laterality: N/A;   • TONSILLECTOMY     • TOTAL ABDOMINAL HYSTERECTOMY WITH SALPINGO OOPHORECTOMY      For ovarian cancer     General Information     Row Name 09/15/21 112          OT Time and Intention    Document Type  therapy note (daily note)  -MA     Mode of Treatment  occupational therapy  -MA     Row Name 09/15/21 112          General Information    Patient Profile Reviewed  yes  -MA     Existing Precautions/Restrictions  fall;other (see comments)  dysarthria, coordination deficits  -MA     Barriers to Rehab  medically complex  -MA     Row Name 09/15/21 1121          Cognition    Orientation Status (Cognition)  oriented x 3  -MA     Row Name 09/15/21 1121          Safety Issues, Functional Mobility    Impairments Affecting Function (Mobility)  balance;endurance/activity tolerance;strength;coordination;motor control  -MA       User Key  (r) = Recorded By, (t) = Taken By, (c) = Cosigned By    Initials Name Provider Type    Brenda Edwards OT Occupational Therapist          Mobility/ADL's     Row Name 09/15/21 1122          Bed Mobility    Bed Mobility  supine-sit  -MA     Supine-Sit [place] (Bed Mobility)  contact guard;verbal cues  -MA     Assistive Device (Bed Mobility)  bed rails;head of bed elevated  -MA     Comment (Bed Mobility)  Pt req extended time and effort to complete supine-to-sit w/ CGA, VC's for sequencing and hand placement  -MA     Row Name 09/15/21 1122          Transfers    Transfers  sit-stand transfer;bed-chair transfer  -MA     Comment (Transfers)  Pt CGA for STS, min Ax2 for functional mobility around room to chair w/ B HHA. VC's for sequencing and hand placement  -MA     Bed-Chair [place] (Transfers)  minimum assist (75% patient effort);2 person assist;verbal cues  -MA     Assistive Device (Bed-Chair Transfers)  other (see comments) B HHA  -MA     Sit-Stand [place] (Transfers)  contact guard;verbal cues  -MA     Row Name 09/15/21 1122          Sit-Stand Transfer    Assistive Device (Sit-Stand Transfers)  other (see comments) No AD, VC's to push from bed  -MA     Row Name 09/15/21 1122          Functional Mobility    Functional Mobility- Ind. Level  minimum assist (75% patient effort);2 person assist required;verbal cues required  -MA     Functional Mobility- Device  other (see comments) B HHA  -MA     Functional Mobility-Distance (Feet)  12  -MA     Functional Mobility- Safety Issues  balance decreased during  turns;sequencing ability decreased  -MA     Row Name 09/15/21 1122          Activities of Daily Living    BADL Assessment/Intervention  grooming  -MA     Row Name 09/15/21 1122          Grooming Assessment/Training    [place] Level (Grooming)  hair care, combing/brushing;wash face, hands;oral care regimen;set up;verbal cues  -MA     Position (Grooming)  supported sitting  -MA       User Key  (r) = Recorded By, (t) = Taken By, (c) = Cosigned By    Initials Name Provider Type    Brenda Edwards OT Occupational Therapist        Obj/Interventions     Row Name 09/15/21 1125          Balance    Balance Assessment  sitting dynamic balance;standing dynamic balance  -MA     Dynamic Sitting Balance  WFL;supported;sitting in chair;sitting, edge of bed  -MA     Dynamic Standing Balance  mild impairment;supported;standing  -MA     Balance Interventions  sit to stand;occupation based/functional task  -MA       User Key  (r) = Recorded By, (t) = Taken By, (c) = Cosigned By    Initials Name Provider Type    Brenda Edwards OT Occupational Therapist        Goals/Plan    No documentation.       Clinical Impression     Row Name 09/15/21 1125          Pain Assessment    Additional Documentation  Pain Scale: Numbers Pre/Post-Treatment (Group)  -MA     Row Name 09/15/21 1125          Pain Scale: Numbers Pre/Post-Treatment    Pretreatment Pain Rating  0/10 - no pain  -MA     Posttreatment Pain Rating  0/10 - no pain  -MA     Row Name 09/15/21 1125          Plan of Care Review    Plan of Care Reviewed With  patient  -MA     Outcome Summary  Pt presents w/ generalized weakness, decreased activity tolerance, and mild balance deficits limiting her ADL independence. Pt gave good effort throughout skilled OT treatment session, VC's req for sequencing and safety throughout. Will continue to progress pt as tolerated per OT POC.  -MA     Row Name 09/15/21 1125          Therapy Assessment/Plan (OT)    Rehab Potential (OT)  good, to  achieve stated therapy goals  -MA     Criteria for Skilled Therapeutic Interventions Met (OT)  yes;skilled treatment is necessary  -MA     Therapy Frequency (OT)  daily  -MA     Row Name 09/15/21 1125          Therapy Plan Review/Discharge Plan (OT)    Anticipated Discharge Disposition (OT)  home with assist;home with home health  -MA     Row Name 09/15/21 1125          Vital Signs    Pre Systolic BP Rehab  114  -MA     Pre Treatment Diastolic BP  46  -MA     Pretreatment Heart Rate (beats/min)  71  -MA     Posttreatment Heart Rate (beats/min)  74  -MA     Pre SpO2 (%)  95  -MA     O2 Delivery Pre Treatment  room air  -MA     O2 Delivery Intra Treatment  room air  -MA     Post SpO2 (%)  95  -MA     O2 Delivery Post Treatment  room air  -MA     Pre Patient Position  Supine  -MA     Intra Patient Position  Standing  -MA     Post Patient Position  Sitting  -MA     Row Name 09/15/21 1125          Positioning and Restraints    Pre-Treatment Position  in bed  -MA     Post Treatment Position  chair  -MA     In Chair  notified nsg;reclined;call light within reach;encouraged to call for assist;exit alarm on;waffle cushion;legs elevated  -MA       User Key  (r) = Recorded By, (t) = Taken By, (c) = Cosigned By    Initials Name Provider Type    Brenda Edwards, OT Occupational Therapist        Outcome Measures     Row Name 09/15/21 1127          How much help from another is currently needed...    Putting on and taking off regular lower body clothing?  2  -MA     Bathing (including washing, rinsing, and drying)  2  -MA     Toileting (which includes using toilet bed pan or urinal)  3  -MA     Putting on and taking off regular upper body clothing  3  -MA     Taking care of personal grooming (such as brushing teeth)  3  -MA     Eating meals  3  -MA     AM-PAC 6 Clicks Score (OT)  16  -MA     Row Name 09/15/21 0800          How much help from another person do you currently need...    Turning from your back to your side while in  flat bed without using bedrails?  3  -CB     Moving from lying on back to sitting on the side of a flat bed without bedrails?  3  -CB     Moving to and from a bed to a chair (including a wheelchair)?  3  -CB     Standing up from a chair using your arms (e.g., wheelchair, bedside chair)?  3  -CB     Climbing 3-5 steps with a railing?  2  -CB     To walk in hospital room?  3  -CB     AM-PAC 6 Clicks Score (PT)  17  -CB     Row Name 09/15/21 1127          Functional Assessment    Outcome Measure Options  AM-PAC 6 Clicks Daily Activity (OT)  -MA       User Key  (r) = Recorded By, (t) = Taken By, (c) = Cosigned By    Initials Name Provider Type    Salvador Cisneros RN Registered Nurse    Brenda Edwards OT Occupational Therapist          Occupational Therapy Education                 Title: PT OT SLP Therapies (In Progress)     Topic: Occupational Therapy (In Progress)     Point: ADL training (Done)     Description:   Instruct learner(s) on proper safety adaptation and remediation techniques during self care or transfers.   Instruct in proper use of assistive devices.              Learning Progress Summary           Patient Acceptance, E, VU by MA at 9/15/2021 1101    Acceptance, E, VU by MA at 9/13/2021 1119                   Point: Home exercise program (Not Started)     Description:   Instruct learner(s) on appropriate technique for monitoring, assisting and/or progressing therapeutic exercises/activities.              Learner Progress:  Not documented in this visit.          Point: Precautions (Done)     Description:   Instruct learner(s) on prescribed precautions during self-care and functional transfers.              Learning Progress Summary           Patient Acceptance, E, VU by MA at 9/15/2021 1101    Acceptance, E, VU by MA at 9/13/2021 1119                   Point: Body mechanics (Done)     Description:   Instruct learner(s) on proper positioning and spine alignment during self-care, functional mobility  activities and/or exercises.              Learning Progress Summary           Patient Acceptance, E, VU by MA at 9/15/2021 1101    Acceptance, E, VU by MA at 9/13/2021 1119                               User Key     Initials Effective Dates Name Provider Type [facility] [place] 11/19/20 -  Brenda Mcgrath OT Occupational Therapist OT              OT Recommendation and Plan  Therapy Frequency (OT): daily  Plan of Care Review  Plan of Care Reviewed With: patient  Outcome Summary: Pt presents w/ generalized weakness, decreased activity tolerance, and mild balance deficits limiting her ADL independence. Pt gave good effort throughout skilled OT treatment session, VC's req for sequencing and safety throughout. Will continue to progress pt as tolerated per OT POC.     Time Calculation:   Time Calculation- OT     Row Name 09/15/21 1128             Time Calculation- OT    OT Start Time  1101  -MA      OT Stop Time  1117  -MA      OT Time Calculation (min)  16 min  -MA      OT Received On  09/15/21  -MA      OT Goal Re-Cert Due Date  09/23/21  -MA         Timed Charges    24123 - OT Therapeutic Activity Minutes  6  -MA      34364 - OT Self Care/Mgmt Minutes  10  -MA         Total Minutes    Timed Charges Total Minutes  16  -MA       Total Minutes  16  -MA        User Key  (r) = Recorded By, (t) = Taken By, (c) = Cosigned By    Initials Name Provider Type    MA Brenda Mcgrath OT Occupational Therapist        Therapy Charges for Today     Code Description Service Date Service Provider Modifiers Qty    63906884860 HC OT SELF CARE/MGMT/TRAIN EA 15 MIN 9/15/2021 Brenda Mcgrath OT GO 1    81247555282 HC OT THER SUPP EA 15 MIN 9/15/2021 Brenda Mcgrath OT GO 1               Brenda Mcgrath OT  9/15/2021

## 2021-09-15 NOTE — THERAPY TREATMENT NOTE
Acute Care - Speech Language Pathology   Swallow Treatment Note [facility]     Patient Name: Sissy Ballard  : 1935  MRN: [id number]  Today's Date: 9/15/2021               Admit Date: 2021    Visit Dx:     ICD-10-CM ICD-9-CM   1. Acute CVA (cerebrovascular accident) (CMS/HCC)  I63.9 434.91   2. Oropharyngeal dysphagia  R13.12 787.22   3. Cognitive communication deficit  R41.841 799.52     Patient Active Problem List   Diagnosis   • Ischemic cerebrovascular accident (CVA) (CMS/HCC)   • Essential hypertension   • Type 2 diabetes mellitus (CMS/HCC)   • Longstanding persistent atrial fibrillation (CMS/HCC)   • Stage 3b chronic kidney disease (CMS/HCC)   • Hypothyroidism (acquired)     Past Medical History:   Diagnosis Date   • Congestive heart failure (CHF) (CMS/HCC)    • Coronary artery disease     MI,  or    • Essential hypertension 2021   • Hypothyroid    • Hypothyroidism (acquired) 2021   • Nephrolithiasis    • Permanent atrial fibrillation (CMS/HCC) on Eliquis 2021   • Type 2 diabetes mellitus (CMS/HCC) 2021     Past Surgical History:   Procedure Laterality Date   • APPENDECTOMY      Performed at the time of her hysterectomy   • CARDIAC CATHETERIZATION       or  with stent she does not know details   • CATARACT EXTRACTION, BILATERAL     •  SECTION      X2   • INTERVENTIONAL RADIOLOGY PROCEDURE N/A 2021    Procedure: IR MECHANICAL THROMBECTOMY - PRIMARY;  Surgeon: Vaughn Dean MD;  Location: [facility] INVASIVE LOCATION;  Service: Interventional Radiology;  Laterality: N/A;   • TONSILLECTOMY     • TOTAL ABDOMINAL HYSTERECTOMY WITH SALPINGO OOPHORECTOMY      For ovarian cancer       SLP Recommendation and Plan  SLP Swallowing Diagnosis: mild, oral dysphagia, moderate, pharyngeal dysphagia  SLP Diet Recommendation: mechanical soft with no mixed consistencies, nectar thick liquids  Recommended Precautions and Strategies: upright posture during/after  eating, small bites of food and sips of liquid, general aspiration precautions, other (see comments)  SLP Rec. for Method of Medication Administration: meds whole, with thick liquids, with pudding or applesauce, as tolerated     Monitor for Signs of Aspiration: yes, notify SLP if any concerns     Swallow Criteria for Skilled Therapeutic Interventions Met: demonstrates skilled criteria  Anticipated Discharge Disposition (SLP): inpatient rehabilitation facility, anticipate therapy at next level of care  Rehab Potential/Prognosis, Swallowing: good, to achieve stated therapy goals  Therapy Frequency (Swallow): 5 days per week  Predicted Duration Therapy Intervention (Days): until discharge     Daily Summary of Progress (SLP): progress toward functional goals as expected    Plan for Continued Treatment (SLP): Continues to demonstrate coughing with thin liquids via cup. Will cont dysphagia therapy and f/u w/ instrumental reassessment when overt s/sxs aspiration decrease. Improved significantly with language and motor speech ability though dysarthria persists.               Plan of Care Reviewed With: patient         SWALLOW EVALUATION (last 72 hours)      SLP Adult Swallow Evaluation     Row Name 09/15/21 0950 09/13/21 1015 09/13/21 0845 09/12/21 1400          Rehab Evaluation    Document Type  therapy note (daily note)  -VO  evaluation  -EN  re-evaluation  -EN  --     Subjective Information  no complaints  -VO  no complaints  -EN  no complaints  -EN  --     Patient Observations  alert;cooperative  -VO  alert;cooperative;agree to therapy  -EN  alert;cooperative;agree to therapy  -EN  --     Patient/Family/Caregiver Comments/Observations  --  son present   -EN  no family present   -EN  --     Care Plan Review  care plan/treatment goals reviewed  -VO  evaluation/treatment results reviewed;care plan/treatment goals reviewed;risks/benefits reviewed;current/potential barriers reviewed;patient/other agree to care plan  -EN   evaluation/treatment results reviewed;care plan/treatment goals reviewed;risks/benefits reviewed;current/potential barriers reviewed;patient/other agree to care plan  -EN  --     Patient Effort  good  -VO  good  -EN  good  -EN  --     Symptoms Noted During/After Treatment  --  none  -EN  none  -EN  --        General Information    Patient Profile Reviewed  --  yes  -EN  yes  -EN  --     Pertinent History Of Current Problem  --  See initial eval  -EN  See initial eval  -EN  --     Current Method of Nutrition  --  NPO  -EN  NPO  -EN  --     Precautions/Limitations, Vision  --  corrective lenses needed for reading  -EN  corrective lenses needed for reading  -EN  --     Precautions/Limitations, Hearing  --  WFL;for purposes of eval  -EN  WFL;for purposes of eval  -EN  --     Prior Level of Function-Communication  --  WFL  -EN  WFL  -EN  WFL  -ML     Prior Level of Function-Swallowing  --  no diet consistency restrictions  -EN  no diet consistency restrictions  -EN  no diet consistency restrictions  -ML     Plans/Goals Discussed with  --  patient and family;agreed upon  -EN  agreed upon;patient  -EN  --     Barriers to Rehab  --  none identified  -EN  none identified  -EN  --     Patient's Goals for Discharge  --  patient did not state  -EN  patient did not state  -EN  --     Family Goals for Discharge  --  family did not state  -EN  --  --        Pain    Additional Documentation  --  Pain Scale: FACES Pre/Post-Treatment (Group)  -EN  Pain Scale: FACES Pre/Post-Treatment (Group)  -EN  --        Pain Scale: Numbers Pre/Post-Treatment    Pretreatment Pain Rating  0/10 - no pain  -VO  --  --  --     Posttreatment Pain Rating  0/10 - no pain  -VO  --  --  --        Pain Scale: FACES Pre/Post-Treatment    Pain: FACES Scale, Pretreatment  --  0-->no hurt  -EN  0-->no hurt  -EN  --     Posttreatment Pain Rating  --  0-->no hurt  -EN  0-->no hurt  -EN  --        Oral Motor Structure and Function    Dentition Assessment  --  --   upper dentures/partial in place  -EN  --     Secretion Management  --  --  WNL/WFL  -EN  WNL/WFL  -ML     Mucosal Quality  --  --  dry  -EN  --     Volitional Swallow  --  --  WFL  -EN  WFL  -ML     Volitional Cough  --  --  WFL  -EN  weak  -ML        Oral Musculature and Cranial Nerve Assessment    Oral Motor General Assessment  --  --  oral labial or buccal impairment  -EN  --     Oral Labial or Buccal Impairment, Detail, Cranial Nerve VII (Facial):  --  --  right labial droop  -EN  --        General Eating/Swallowing Observations    Respiratory Support Currently in Use  --  --  --  nasal cannula  -ML     Eating/Swallowing Skills  --  --  --  fed by SLP  -ML     Positioning During Eating  --  --  --  -- Restricted position due to thrombectomy  -ML     Utensils Used  --  --  --  spoon  -ML     Consistencies Trialed  --  --  --  ice chips;nectar/syrup-thick liquids;honey-thick liquids;pureed  -ML     Pre SpO2 (%)  --  --  --  100  -ML     Post SpO2 (%)  --  --  --  100  -ML        Clinical Swallow Eval    Oral Prep Phase  --  --  WFL  -EN  impaired  -ML     Oral Transit  --  --  WFL  -EN  WFL  -ML     Oral Residue  --  --  WFL  -EN  impaired  -ML     Pharyngeal Phase  --  --  suspected pharyngeal impairment  -EN  suspected pharyngeal impairment  -ML     Esophageal Phase  --  --  unremarkable  -EN  unremarkable  -ML     Clinical Swallow Evaluation Summary  --  --  Re-evaluation completed this am. Improved however continued s/s of aspiration w/ thin liquids via all presentation styles. No overt s/s w/ NT liquids via spoon/cup/straw, pureed, or solid cracker. Intermittent wet vocal quality following conclusion of evaluation. Will order FEES to further evaluate. NPO until FEES completed.   -EN  Clinical swallowing assessment completed. Pt with dentures in place which were not removed prior to thrombectomy. Dentures/oral cavity with oatmeal residue from breakfast pt ate at home.  Pt is unable to clear oral cavity of  oatmeal residue despite awareness.  Oral care completed including dentures removed and cleaned.  Suspect premature loss with thin liquids, however positioning at 35 degrees in bed is likely contributing.  Pharyngeal signs with ice, nectar, and honey. No overt signs with puree with limited trials.  Pharyngeal signs include a persisent cough following ice/nectar/honey trials.  SLP to reevaluate swallowing tomorrow with increased time post procedure and when pt can sit upright to 90 degrees. Instructed pt/son/RN ok for PO medications only with applesauce.      -ML        Oral Prep Concerns    Oral Prep Concerns  --  --  --  other (see comments) Residue from oatmeal removed/? premature loss with ice  -ML        Oral Residue Concerns    Oral Residue Concerns  --  --  --  diffuse residue throughout oral cavity trace oatmeal  -ML     Diffuse Residue Throughout Oral Cavity  --  --  --  -- Breakfast/oatmeal  -ML     Oral Residue Concerns, Comment  --  --  --  Pt could unaware and could not clear/oral care completed  -ML        Pharyngeal Phase Concerns    Pharyngeal Phase Concerns  --  --  cough;wet vocal quality  -EN  cough;multiple swallows  -ML     Wet Vocal Quality  --  --  other (see comments) at end of eval  -EN  --     Multiple Swallows  --  --  --  thin  -ML     Cough  --  --  thin  -EN  thin;nectar;honey  -ML        Fiberoptic Endoscopic Evaluation of Swallowing (FEES)    Risks/Benefits Reviewed  --  risks/benefits explained;patient;family;agreed to eval  -EN  --  --     Nasal Entry  --  left:  -EN  --  --     Scope serial number/identification  --  [id number]  -EN  --  --        Anatomy and Physiology    Anatomic Considerations  --  no anatomic structural deviation  -EN  --  --     Velopharyngeal  --  WFL  -EN  --  --     Base of Tongue  --  symmetrical  -EN  --  --     Epiglottis  --  WFL  -EN  --  --     Laryngeal Function Breathing  --  symmetrical  -EN  --  --     Laryngeal Function Phonation  --  symmetrical  -EN  --   --     Laryngeal Function to Breath Hold  --  TVF contact;sustained closure  -EN  --  --     Secretion Rating Scale (Ze et al. 1996)  --  0- normal, no visible secretions  -EN  --  --     Ice Chips  --  DNA  -EN  --  --     Spontaneous Swallow  --  frequency adequate  -EN  --  --     Sensory  --  reduced sensation  -EN  --  --     Utensils Used  --  Spoon;Cup;Straw  -EN  --  --     Consistencies Trialed  --  thin liquids;nectar-thick liquids;pudding/puree;regular textures  -EN  --  --        FEES Interpretation    Oral Phase  --  prespill of liquids into pharynx  -EN  --  --        Initiation of Pharyngeal Swallow    Initiation of Pharyngeal Swallow  --  bolus in pyriform sinuses  -EN  --  --     Pharyngeal Phase  --  impaired pharyngeal phase of swallowing  -EN  --  --     Penetration Before the Swallow  --  thin liquids;secondary to reduced back of tongue control;secondary to delayed swallow initiation or mistiming  -EN  --  --     Aspiration During the Swallow  --  thin liquids;secondary to delayed swallow initiation or mistiming;secondary to reduced laryngeal elevation;secondary to reduced vestibular closure;secondary to reduced laryngeal (VF) closure  -EN  --  --     Response to Penetration  --  deep;cough  -EN  --  --     Response to Aspiration  --  cough;could not clear subglottic material  -EN  --  --     Rosenbek's Scale  --  thin:;7-->Level 7;nectar:;pudding/puree:;regular textures:;1-->Level 1  -EN  --  --     Residue  --  thin liquids;valleculae;pyriform sinuses;secondary to reduced base of tongue retraction;secondary to reduced posterior pharyngeal wall stripping;secondary to reduced laryngeal elevation;secondary to reduced hyolaryngeal excursion  -EN  --  --     Response to Residue  --  cleared residue;with cued swallow  -EN  --  --     Attempted Compensatory Maneuvers  --  bolus size;bolus presentation style;additional subsequent swallow  -EN  --  --     Response to Attempted Compensatory  Maneuvers  --  prevented penetration;prevented aspiration;reduced residue  -EN  --  --     FEES Summary  --  Mild oral phase deficits and moderate pharyngeal phase dysphagia. Prespill of thin liquids into pharynx w/ cup/straw. Penetration before w/ aspiration during the swallow w/ thin liquids via cup and straw. Pt sensed aspiration w/ hard immediate cough. Noted w/ difficulty recovering from aspiration event. No penetration or aspiration w/ nectar-thick liquids via spoon/cup/straw, pudding, or regular solids. Mild vallecular and pyriform sinus residue noted which pt cleared w/ cued consecutive swallow. Recommend level 4 dysphagia diet and nectar-thick liquids. Meds whole as tolerated.   -EN  --  --        Clinical Impression    Daily Summary of Progress (SLP)  progress toward functional goals as expected  -VO  --  --  --     SLP Swallowing Diagnosis  mild;oral dysphagia;moderate;pharyngeal dysphagia  -VO  mild;oral dysphagia;moderate;pharyngeal dysphagia  -EN  suspected pharyngeal dysphagia  -EN  oral dysphagia;pharyngeal dysphagia  -ML     Functional Impact  risk of aspiration/pneumonia;risk of dehydration  -VO  risk of aspiration/pneumonia;risk of dehydration  -EN  risk of aspiration/pneumonia;risk of dehydration  -EN  risk of aspiration/pneumonia;risk of dehydration  -ML     Rehab Potential/Prognosis, Swallowing  good, to achieve stated therapy goals  -VO  good, to achieve stated therapy goals  -EN  good, to achieve stated therapy goals  -EN  good, to achieve stated therapy goals  -ML     Swallow Criteria for Skilled Therapeutic Interventions Met  demonstrates skilled criteria  -VO  demonstrates skilled criteria  -EN  demonstrates skilled criteria  -EN  demonstrates skilled criteria  -ML     Plan for Continued Treatment (SLP)  Continues to demonstrate coughing with thin liquids via cup. Will cont dysphagia therapy and f/u w/ instrumental reassessment when overt s/sxs aspiration decrease. Improved significantly  with language and motor speech ability though dysarthria persists.   -VO  FEES completed. Initiate dysphagia level 4 diet and nectar-thick liquids. Pt would benefit from implementing extra swallow intermittently during po intake. F/u to begin addressing dysphagia deficits in therapy.   -EN  FEES  -EN  --        Recommendations    Therapy Frequency (Swallow)  5 days per week  -VO  5 days per week  -EN  --  other (see comments) Pending reevaluation  -ML     Predicted Duration Therapy Intervention (Days)  until discharge  -VO  until discharge  -EN  until discharge  -EN  --     SLP Diet Recommendation  mechanical soft with no mixed consistencies;nectar thick liquids  -VO  mechanical soft with no mixed consistencies;nectar thick liquids  -EN  NPO  -EN  NPO  -ML     Recommended Diagnostics  --  --  FEES  -EN  reassess via clinical swallow evaluation  -ML     Recommended Precautions and Strategies  upright posture during/after eating;small bites of food and sips of liquid;general aspiration precautions;other (see comments)  -VO  upright posture during/after eating;small bites of food and sips of liquid;general aspiration precautions;other (see comments) extra swallow intermittently.  -EN  general aspiration precautions  -EN  --     Oral Care Recommendations  Suction toothbrush  -VO  Suction toothbrush;Oral Care BID/PRN  -EN  Suction toothbrush;Oral Care BID/PRN  -EN  Suction toothbrush;Oral Care BID/PRN  -ML     SLP Rec. for Method of Medication Administration  meds whole;with thick liquids;with pudding or applesauce;as tolerated  -VO  meds whole;with thick liquids;with pudding or applesauce;as tolerated  -EN  with pudding or applesauce  -EN  with pudding or applesauce  -ML     Monitor for Signs of Aspiration  yes;notify SLP if any concerns  -VO  yes;notify SLP if any concerns  -EN  yes;notify SLP if any concerns  -EN  yes;notify SLP if any concerns  -ML     Anticipated Discharge Disposition (SLP)  inpatient rehabilitation  facility;anticipate therapy at next level of care  -VO  inpatient rehabilitation facility  -EN  inpatient rehabilitation facility  -EN  --       User Key  (r) = Recorded By, (t) = Taken By, (c) = Cosigned By    Initials Name Effective Dates     Deb Graff MA,CCC-SLP 06/16/21 -     ML Silva Hooks, CCC-SLP 06/16/21 -     EN Octavia Rossi MS, [facility]-SLP 05/20/21 -           EDUCATION  The patient has been educated in the following areas:   Communication Impairment Dysphagia (Swallowing Impairment) Modified Diet Instruction.       SLP GOALS     Row Name 09/15/21 0950 09/13/21 1015 09/12/21 1400       Oral Nutrition/Hydration Goal 1 (SLP)    Oral Nutrition/Hydration Goal 1, SLP  --  LTG: Pt will return to regular diet and thin liquids w/o s/s of aspiration during 100% of trials w/o cues  -EN  --    Time Frame (Oral Nutrition/Hydration Goal 1, SLP)  --  by discharge  -EN  --       Oral Nutrition/Hydration Goal 2 (SLP)    Oral Nutrition/Hydration Goal 2, SLP  --  Pt will tolerate therapeutic trials of ice/thin H2O and regular solids w/o s/s of aspiration in 60% of trials and no cues in order to demonstrate readiness for repeat instrumental evaluation.  -EN  --    Time Frame (Oral Nutrition/Hydration Goal 2, SLP)  --  short term goal (STG)  -EN  --       Oral Nutrition/Hydration Goal (SLP)    Oral Nutrition/Hydration Goal, SLP  --  Pt will tolerate nectar-thick liquids and soft solids w/o s/s of aspiration or distress w/ 100% acc and no cues.  -EN  --    Time Frame (Oral Nutrition/Hydration Goal, SLP)  --  short term goal (STG)  -EN  --       Lingual Strengthening Goal 1 (SLP)    Activity (Lingual Strengthening Goal 1, SLP)  --  increase lingual tone/sensation/control/coordination/movement  -EN  --    Increase Lingual Tone/Sensation/Control/Coordination/Movement  --  lingual resistance exercises  -EN  --    Vining/Accuracy (Lingual Strengthening Goal 1, SLP)  --  independently (over 90% accuracy)   -EN  --    Time Frame (Lingual Strengthening Goal 1, SLP)  --  by discharge  -EN  --       Pharyngeal Strengthening Exercise Goal 1 (SLP)    Activity (Pharyngeal Strengthening Goal 1, SLP)  --  increase timing;increase superior movement of the hyolaryngeal complex;increase anterior movement of the hyolaryngeal complex;increase closure at entrance to airway/closure of airway at glottis;increase squeeze/positive pressure generation  -EN  --    Increase Timing  --  prepping - 3 second prep or suck swallow or 3-step swallow  -EN  --    Increase Superior Movement of the Hyolaryngeal Complex  --  effortful pitch glide (falsetto + pharyngeal squeeze)  -EN  --    Increase Anterior Movement of the Hyolaryngeal Complex  --  chin tuck against resistance (CTAR)  -EN  --    Increase Closure at Entrance to Airway/Closure of Airway at Glottis  --  supraglottic swallow  -EN  --    Increase Squeeze/Positive Pressure Generation  --  hard effortful swallow  -EN  --    Parke/Accuracy (Pharyngeal Strengthening Goal 1, SLP)  --  independently (over 90% accuracy)  -EN  --    Time Frame (Pharyngeal Strengthening Goal 1, SLP)  --  short term goal (STG)  -EN  --       Swallow Compensatory Strategies Goal 1 (SLP)    Activity (Swallow Compensatory Strategies/Techniques Goal 1, SLP)  --  compensatory strategies;during meal intake;during p.o. trials;extra swallow per bolus;other (see comments) intermittently  -EN  --    Parke/Accuracy (Swallow Compensatory Strategies/Techniques Goal 1, SLP)  --  with minimal cues (75-90% accuracy)  -EN  --    Time Frame (Swallow Compensatory Strategies/Techniques Goal 1, SLP)  --  short term goal (STG)  -EN  --       Word Retrieval Skills Goal 1 (SLP)    Improve Word Retrieval Skills By Goal 1 (SLP)  completing a divergent task;completing a convergent task;complex;responsive naming task;80%;with minimal cues (75-90%)  -VO  completing automatic speech task, counting;completing automatic speech task,  alphabet;completing automatic speech task, days of the week;completing automatic speech task, months;confrontational naming task;repeating words;repeating phrases;70%;with moderate cues (50-74%)  -EN  completing automatic speech task, counting;completing automatic speech task, alphabet;completing automatic speech task, days of the week;completing automatic speech task, months;confrontational naming task;repeating words;repeating phrases;70%;with moderate cues (50-74%)  -ML    Time Frame (Word Retrieval Goal 1, SLP)  short term goal (STG)  -VO  short term goal (STG);by discharge  -EN  short term goal (STG);by discharge  -ML    Barriers (Word Retrieval Goal 1, SLP)  --  --  Apraxia  -ML    Progress (Word Retrieval Skills Goal 1, SLP)  --  60%;with minimal cues (75-90%)  -EN  --    Progress/Outcomes (Word Retrieval Goal 1, SLP)  goal revised this date  -VO  continuing progress toward goal  -EN  --       Connected Speech to Express Thoughts Goal 1 (SLP)    Improve Narrative Discourse to Express Thoughts By Goal 1 (SLP)  describing a picture;giving multiple definitions of a word;80%;with minimal cues (75-90%)  -VO  --  --    Time Frame (Connected Speech Goal 1, SLP)  short term goal (STG)  -VO  --  --    Progress/Outcomes (Connected Speech Goal 1, SLP)  goal ongoing  -VO  --  --       Graphic Expression of Shapes, Letters, Numbers Goal (SLP)    Improve Graphic Expression of Shapes, Letters, and Numbers Goal (SLP)  Probe writing when condition warrants  -VO  Probe writing when condition warrants  -EN  Probe writing when condition warrants  -ML    Time Frame (Graphic Expression of Shapes, Letters, and Numbers Goal, SLP)  by discharge  -VO  by discharge  -EN  by discharge  -ML    Progress/Outcomes (Graphic Expression of Shapes, Letters, and Numbers Goal, SLP)  goal ongoing  -VO  goal ongoing  -EN  --       Articulation Goal 1 (SLP)    Improve Articulation Goal 1 (SLP)  by over-articulating at phrase level;by  over-articulating in connected speech;80%;with minimal cues (75-90%)  -VO  by over-articulating at word level;90%;with moderate cues (50-74%)  -EN  by over-articulating at word level;90%;with moderate cues (50-74%)  -ML    Time Frame (Articulation Goal 1, SLP)  short term goal (STG)  -VO  short term goal (STG);by discharge  -EN  short term goal (STG);by discharge  -ML    Progress (Articulation Goal 1, SLP)  --  50%;with minimal cues (75-90%)  -EN  --    Progress/Outcomes (Articulation Goal 1, SLP)  goal revised this date  -VO  continuing progress toward goal  -EN  --       Planning and Execution of Connected Speech Goal 1 (SLP)    Improve Planning and Execution of Connected Speech by Goal 1 (SLP)  repeating contrasting word pairs;repeating word strings of increasing length;80%;with minimal cues (75-90%)  -VO  imitating one syllable words;imitating two syllable words;completing open-ended sentences;repeating phrases;90%;with moderate cues (50-74%)  -EN  imitating one syllable words;imitating two syllable words;completing open-ended sentences;repeating phrases;90%;with moderate cues (50-74%)  -ML    Time Frame (Planning and Execution of Connected Speech Goal 1, SLP)  short term goal (STG)  -VO  short term goal (STG);by discharge  -EN  short term goal (STG);by discharge  -ML    Progress (Planning and Execution of Connected Speech Goal 1, SLP)  --  60%;with minimal cues (75-90%)  -EN  --    Progress/Outcomes (Planning and Execution of Connected Speech Goal 1, SLP)  goal revised this date  -VO  continuing progress toward goal  -EN  --       Additional Goal (SLP)    Additional Goal, SLP  LTG: Pt will improve verbal communication in order to allow optimal participation in care and safety in ADLs.  -VO  LTG: Improve verbal communication  -EN  LTG: Improve verbal communication  -ML    Time Frame (Additional Goal, SLP)  by discharge  -VO  by discharge  -EN  by discharge  -ML    Progress/Outcomes (Additional Goal, SLP)   continuing progress toward goal  -VO  continuing progress toward goal  -EN  --      User Key  (r) = Recorded By, (t) = Taken By, (c) = Cosigned By    Initials Name Provider Type    Deb Julien MA,CCC-SLP Speech and Language Pathologist    Silva Arellano, CCC-SLP Speech and Language Pathologist    APRYL EnidOctavia poe, MS, CFY-SLP Speech and Language Pathologist             Time Calculation:   Time Calculation- SLP     Row Name 09/15/21 1044             Time Calculation- SLP    SLP Start Time  0950  -VO      SLP Received On  09/15/21  -VO         Untimed Charges    [id number] Treatment/ST Modification Prosth Aug Alter   25  -VO      [id number] Treatment Swallow Minutes  23  -VO         Total Minutes    Untimed Charges Total Minutes  48  -VO       Total Minutes  48  -VO        User Key  (r) = Recorded By, (t) = Taken By, (c) = Cosigned By    Initials Name Provider Type    Deb Julien MA,CCC-SLP Speech and Language Pathologist          Therapy Charges for Today     Code Description Service Date Service Provider Modifiers Qty    37680711457 HC ST TREATMENT SWALLOW 2 9/15/2021 Deb Graff MA,CCC-SLP GN 1    [id number] HC ST TREATMENT SPEECH 2 9/15/2021 Deb Graff MA,CCC-SLP GN 1               Deb Graff MA,CCC-SLP  9/15/2021

## 2021-09-15 NOTE — PROGRESS NOTES
"Critical Care Note     LOS: 3 days   Patient Care Team:  Paris Gibbs APRN as PCP - General (Family Medicine)    Chief Complaint/Reason for visit:    Chief Complaint   Patient presents with   • Stroke       Subjective     84-year-old woman with diabetes, hypertension, atrial fibrillation, congestive heart failure, coronary artery disease, hypothyroidism, chronic kidney disease presenting with acute CVA, expressive aphasia and right-sided weakness.  She underwent thrombectomy with improvement in her NIH stroke score from a 10 to an 8.  Her Eliquis was apparently stopped in July.  Her baseline serum creatinine is 1.46.  Speech therapy cleared her for a dysphagia 4 diet.  She ambulated 150 feet with a rolling walker but was noted to have poor bilateral lower extremity coordination.  She had generalized weakness, decreased functional endurance and poor balance.    Interval History:     NIH stroke score is a 2.  She remains in sinus rhythm with a rate of 70-90.  Room air saturation is 93%.  She is voiding without a Balderrama catheter.  Her serum creatinine increased to 1.73.  Urine output adequate at 625 mL    Review of Systems:    All systems were reviewed and negative except as noted in subjective.    Medical history, surgical history, social history, family history reviewed    Objective     Intake/Output:    Intake/Output Summary (Last 24 hours) at 9/15/2021 0841  Last data filed at 9/15/2021 0457  Gross per 24 hour   Intake 1740 ml   Output 625 ml   Net 1115 ml       Nutrition:  Diet Dysphagia; IV - Mechanical Soft No Mixed Consistencies; Nectar / Syrup Thick; Cardiac, Consistent Carbohydrate    Infusions:         Telemetry: Sinus rhythm             Vital Signs  Blood pressure 119/47, pulse 75, temperature 98.5 °F (36.9 °C), temperature source Oral, resp. rate 16, height 149.9 cm (59\"), weight 68.3 kg (150 lb 9.2 oz), SpO2 93 %.    Physical Exam:  General Appearance:   Pleasant older woman in no distress   Head:   " Normal cephalic, atraumatic   Eyes:          Pupils equal and reactive to light.  No jaundice.  Conjunctiva pink   Ears:     Throat:  Oral mucosa moist, no exudate   Neck:  Trachea midline, no palpable thyroid   Back:      Lungs:    Symmetric chest expansion.  Breath sounds are bilateral without wheeze    Heart:   Regular rhythm, S1, S2 auscultated   Abdomen:    Bowel sounds present, soft, nontender   Rectal:   Deferred   Extremities:  No pretibial edema or cyanosis.  Cath site without hematoma   Pulses:  Pedal pulses present   Skin:  Warm and dry, no rash   Lymph nodes:    Neurologic:  Alert with some dysarthria and mild right facial droop.  No motor drift upper or lower extremities      Results Review:     I reviewed the patient's new clinical results.   Results from last 7 days   Lab Units 09/15/21  0451 09/13/21  0433 09/12/21  1212 09/12/21  1154   SODIUM mmol/L 137 140  --   --    POTASSIUM mmol/L 4.5 4.3  --   --    CHLORIDE mmol/L 101 101  --   --    CO2 mmol/L 25.0 25.0  --   --    BUN mg/dL 37* 31*  --   --    CREATININE mg/dL 1.71* 1.38*  --  1.50*   CALCIUM mg/dL 9.0 9.7  --   --    ALT (SGPT) U/L  --   --  12  --    AST (SGOT) U/L  --   --  17  --    GLUCOSE mg/dL 211* 75  --   --      Results from last 7 days   Lab Units 09/15/21  0451 09/12/21  1212 09/12/21  1154   WBC 10*3/mm3 9.31 8.31  --    HEMOGLOBIN g/dL 11.0* 11.6*  --    HEMOGLOBIN, POC g/dL  --   --  14.3   HEMATOCRIT % 32.7* 35.3  --    HEMATOCRIT POC %  --   --  42   PLATELETS 10*3/mm3 206 194  --      Results from last 7 days   Lab Units 09/12/21  1154   PH, ARTERIAL pH units 7.45     No results found for: BLOODCX  Lab Results   Component Value Date    URINECX >100,000 CFU/mL Escherichia coli (A) 09/13/2021       I reviewed the patient's new imaging including images and reports.       FINDINGS: MRI     Patchy restricted diffusion involving the left frontal lobe and a portion of the left insula is consistent with reported history of left  M2 occlusion. Overall involved area is maximally measuring 3.1 cm.     No additional sites of restricted diffusion to suggest additional vascular territory infarct. No hydrocephalus. Rounded intermediate T2 intensity along the left anterior temporal pole correlates with homogeneously enhancing focus on the CTA of the head  and neck and most likely represents a small meningioma measuring up to 1.1 cm.     There are scattered T2/FLAIR signal hyperintensities within the bilateral cerebral and deep white matter which are nonspecific but most commonly associated with chronic microvascular ischemic change.     Midline structures are within normal limits.     Normal calvarial marrow signal. Mucosal thickening within the right maxillary antrum and patchy fluid in the right mastoid air cells.        IMPRESSION:  1.  Findings of left frontal/insular acute/subacute infarct, consistent with history of left M2 occlusion.  2.  Probable small meningioma along the left anterior temporal pole.     Signer Name: CHAU GHOTRA MD   Signed: 9/12/2021 9:29 PM    Interpretation Summary    · Saline test results are negative.  · Estimated right ventricular systolic pressure from tricuspid regurgitation is normal (<35 mmHg).  · The following left ventricular wall segments are hypokinetic: basal anterolateral, basal inferolateral, mid inferolateral, apical inferior, mid inferior, basal inferoseptal, mid inferoseptal, basal anterior, basal inferior and basal inferoseptal. The following left ventricular wall segments are akinetic: mid anterior, apical anterior, mid anterolateral, apical lateral, apical septal, apex and mid anteroseptal.  · Estimated left ventricular EF = 40% Estimated left ventricular EF was in agreement with the calculated left ventricular EF. Left ventricular ejection fraction appears to be 36 - 40%. Left ventricular systolic function is mildly decreased.  · Left ventricular diastolic function is consistent with (grade II w/high  LAP) pseudonormalization.  · Normal right ventricular cavity size, wall thickness, systolic function and septal motion noted.  · The aortic valve exhibits mild sclerosis.  · No evidence of pulmonary hypertension is present.  · There is no evidence of pericardial effusion.  · No evidence of a patent foramen ovale.         All medications reviewed.   apixaban, 2.5 mg, Oral, Q12H  aspirin, 81 mg, Oral, Daily  atorvastatin, 80 mg, Oral, Nightly  cefTRIAXone, 1 g, Intravenous, Q24H  insulin detemir, 20 Units, Subcutaneous, Daily  insulin lispro, 0-14 Units, Subcutaneous, 4x Daily With Meals & Nightly  insulin lispro, 8 Units, Subcutaneous, TID With Meals  levothyroxine, 50 mcg, Oral, Q AM  metoprolol tartrate, 25 mg, Oral, Q12H  pantoprazole, 40 mg, Oral, Q AM  sodium chloride, 10 mL, Intravenous, Q12H          Assessment/Plan       Ischemic cerebrovascular accident (CVA) (CMS/HCC)    Essential hypertension    Type 2 diabetes mellitus (CMS/HCC)    Longstanding persistent atrial fibrillation (CMS/HCC)    Stage 3b chronic kidney disease (CMS/HCC)    Hypothyroidism (acquired)    #1 acute ischemic stroke status post thrombectomy with significant improvement.  Today her NIH stroke scale is a 2.  She was evaluated by speech and underwent a fees and a dysphagia 4 diet has been ordered.  Presumed source is cardioembolic because her Eliquis was stopped, she could not get it refilled.  Echocardiogram revealed no PFO, EF 40%.      #2 history of atrial fibrillation with possible tachybradycardia syndrome.  Echocardiogram revealed some hypokinetic wall motion, and EF reduced at 36 to 40% with a sclerotic aortic valve and no PFO.  She is currently in sinus rhythm.     #3 diabetes mellitus type 2, poorly controlled with an A1c of 10.  She apparently uses Levemir at home.  Glucoses are running 210-410, despite addition of long-acting insulin    #4 hypothyroidism on replacement therapy    #6 chronic kidney disease stage IIIb serum  creatinine is increased today at 1.73.  She was 1.38 on admission.  She did receive some contrast and we will need to watch for worsening function.  Urine output is adequate    #7 urinalysis with 4+ bacteria and many white cells consistent with a probable urinary tract infection.  Rocephin started September 13, today should be her third and last dose    #8 hypertension, she has been off Cardene drip since yesterday afternoon.  Oral metoprolol restarted.  She also took lisinopril 5 mg as an outpatient but her systolic is 104-125 so I will not restart lisinopril      PLAN:      Diabetes educator  Levemir 20 units daily, increase to 25 unit  Humalog 8 units with meals plus sliding scale    Systolic pressure less than 185  Continue metoprolol  Hold lisinopril for now and monitor renal function    Continue thyroid replacement    Monitor NIH stroke scale  PT, OT, speech therapy    Monitor rhythm     Eliquis, 2.5 mg twice daily    Aspirin, statin    Continue Rocephin x3 doses for urinary tract infection, this is day 3    Anticipate inpatient rehabilitation    Telemetry    VTE Prophylaxis: anticoagulated    Stress Ulcer Prophylaxis: Protonix    Patient discussed in multidisciplinary rounds    Maite Ulloa MD  09/15/21  08:41 EDT      Time: 25 minutes

## 2021-09-15 NOTE — PLAN OF CARE
Goal Outcome Evaluation:  Plan of Care Reviewed With: patient            SLP treatment completed. Will continue to address dysphagia and communication. Please see note for further details and recommendations.

## 2021-09-15 NOTE — PLAN OF CARE
Goal Outcome Evaluation:  Plan of Care Reviewed With: patient        Progress: no change  Outcome Summary: NIH 2. Patient A&O.  No new issues this shift. Denies pain. VSS.  mls.

## 2021-09-16 LAB
ALBUMIN SERPL-MCNC: 3.2 G/DL (ref 3.5–5.2)
ANION GAP SERPL CALCULATED.3IONS-SCNC: 9 MMOL/L (ref 5–15)
BUN SERPL-MCNC: 34 MG/DL (ref 8–23)
BUN/CREAT SERPL: 22.5 (ref 7–25)
CALCIUM SPEC-SCNC: 9 MG/DL (ref 8.6–10.5)
CHLORIDE SERPL-SCNC: 101 MMOL/L (ref 98–107)
CO2 SERPL-SCNC: 24 MMOL/L (ref 22–29)
CREAT SERPL-MCNC: 1.51 MG/DL (ref 0.57–1)
DEPRECATED RDW RBC AUTO: 49.6 FL (ref 37–54)
ERYTHROCYTE [DISTWIDTH] IN BLOOD BY AUTOMATED COUNT: 16.1 % (ref 12.3–15.4)
GFR SERPL CREATININE-BSD FRML MDRD: 33 ML/MIN/1.73
GLUCOSE BLDC GLUCOMTR-MCNC: 261 MG/DL (ref 70–130)
GLUCOSE BLDC GLUCOMTR-MCNC: 293 MG/DL (ref 70–130)
GLUCOSE BLDC GLUCOMTR-MCNC: 347 MG/DL (ref 70–130)
GLUCOSE BLDC GLUCOMTR-MCNC: 392 MG/DL (ref 70–130)
GLUCOSE SERPL-MCNC: 247 MG/DL (ref 65–99)
HCT VFR BLD AUTO: 32.9 % (ref 34–46.6)
HGB BLD-MCNC: 10.7 G/DL (ref 12–15.9)
MCH RBC QN AUTO: 27.4 PG (ref 26.6–33)
MCHC RBC AUTO-ENTMCNC: 32.5 G/DL (ref 31.5–35.7)
MCV RBC AUTO: 84.4 FL (ref 79–97)
PHOSPHATE SERPL-MCNC: 3.7 MG/DL (ref 2.5–4.5)
PLATELET # BLD AUTO: 182 10*3/MM3 (ref 140–450)
PMV BLD AUTO: 11.2 FL (ref 6–12)
POTASSIUM SERPL-SCNC: 4.9 MMOL/L (ref 3.5–5.2)
RBC # BLD AUTO: 3.9 10*6/MM3 (ref 3.77–5.28)
SODIUM SERPL-SCNC: 134 MMOL/L (ref 136–145)
WBC # BLD AUTO: 8.03 10*3/MM3 (ref 3.4–10.8)

## 2021-09-16 PROCEDURE — 63710000001 INSULIN DETEMIR PER 5 UNITS: Performed by: FAMILY MEDICINE

## 2021-09-16 PROCEDURE — 97110 THERAPEUTIC EXERCISES: CPT

## 2021-09-16 PROCEDURE — 85027 COMPLETE CBC AUTOMATED: CPT | Performed by: INTERNAL MEDICINE

## 2021-09-16 PROCEDURE — 99233 SBSQ HOSP IP/OBS HIGH 50: CPT | Performed by: FAMILY MEDICINE

## 2021-09-16 PROCEDURE — 63710000001 INSULIN DETEMIR PER 5 UNITS: Performed by: INTERNAL MEDICINE

## 2021-09-16 PROCEDURE — 63710000001 INSULIN LISPRO (HUMAN) PER 5 UNITS: Performed by: INTERNAL MEDICINE

## 2021-09-16 PROCEDURE — 97116 GAIT TRAINING THERAPY: CPT

## 2021-09-16 PROCEDURE — 82962 GLUCOSE BLOOD TEST: CPT

## 2021-09-16 PROCEDURE — G0108 DIAB MANAGE TRN  PER INDIV: HCPCS

## 2021-09-16 PROCEDURE — 80069 RENAL FUNCTION PANEL: CPT | Performed by: INTERNAL MEDICINE

## 2021-09-16 PROCEDURE — 63710000001 INSULIN LISPRO (HUMAN) PER 5 UNITS: Performed by: FAMILY MEDICINE

## 2021-09-16 RX ADMIN — INSULIN LISPRO 10 UNITS: 100 INJECTION, SOLUTION INTRAVENOUS; SUBCUTANEOUS at 11:23

## 2021-09-16 RX ADMIN — APIXABAN 2.5 MG: 2.5 TABLET, FILM COATED ORAL at 20:17

## 2021-09-16 RX ADMIN — APIXABAN 2.5 MG: 2.5 TABLET, FILM COATED ORAL at 08:57

## 2021-09-16 RX ADMIN — METOPROLOL TARTRATE 25 MG: 25 TABLET, FILM COATED ORAL at 08:57

## 2021-09-16 RX ADMIN — INSULIN LISPRO 8 UNITS: 100 INJECTION, SOLUTION INTRAVENOUS; SUBCUTANEOUS at 18:04

## 2021-09-16 RX ADMIN — INSULIN DETEMIR 25 UNITS: 100 INJECTION, SOLUTION SUBCUTANEOUS at 08:56

## 2021-09-16 RX ADMIN — INSULIN LISPRO 12 UNITS: 100 INJECTION, SOLUTION INTRAVENOUS; SUBCUTANEOUS at 20:25

## 2021-09-16 RX ADMIN — PANTOPRAZOLE SODIUM 40 MG: 40 TABLET, DELAYED RELEASE ORAL at 05:36

## 2021-09-16 RX ADMIN — INSULIN LISPRO 8 UNITS: 100 INJECTION, SOLUTION INTRAVENOUS; SUBCUTANEOUS at 08:57

## 2021-09-16 RX ADMIN — ACETAMINOPHEN 650 MG: 325 TABLET, FILM COATED ORAL at 18:06

## 2021-09-16 RX ADMIN — INSULIN DETEMIR 10 UNITS: 100 INJECTION, SOLUTION SUBCUTANEOUS at 20:25

## 2021-09-16 RX ADMIN — ASPIRIN 81 MG CHEWABLE TABLET 81 MG: 81 TABLET CHEWABLE at 08:57

## 2021-09-16 RX ADMIN — ATORVASTATIN CALCIUM 80 MG: 40 TABLET, FILM COATED ORAL at 20:17

## 2021-09-16 RX ADMIN — METOPROLOL TARTRATE 25 MG: 25 TABLET, FILM COATED ORAL at 20:17

## 2021-09-16 RX ADMIN — LEVOTHYROXINE SODIUM 50 MCG: 50 TABLET ORAL at 05:36

## 2021-09-16 RX ADMIN — INSULIN LISPRO 8 UNITS: 100 INJECTION, SOLUTION INTRAVENOUS; SUBCUTANEOUS at 11:23

## 2021-09-16 RX ADMIN — INSULIN LISPRO 12 UNITS: 100 INJECTION, SOLUTION INTRAVENOUS; SUBCUTANEOUS at 18:04

## 2021-09-16 NOTE — PLAN OF CARE
Goal Outcome Evaluation:              A&O. Mild dysarthria noted on Nih (2). Able to ambulate with therapy in luu. Sitting up in chair. Voiding via purewic.   VSS.

## 2021-09-16 NOTE — PLAN OF CARE
Goal Outcome Evaluation:  Plan of Care Reviewed With: patient        Progress: no change    PT HAS SLEPT WELL. REMAINS ON RA. NSR ON TELEMETRY. VOIDING PER JOAN. NIHSS OF 2.

## 2021-09-16 NOTE — PLAN OF CARE
Problem: Adult Inpatient Plan of Care  Goal: Plan of Care Review  Flowsheets (Taken 9/16/2021 1440)  Progress: improving  Plan of Care Reviewed With: patient  Outcome Summary: Pt increased ambulation distance to 170 feet using RW and CGA for safety. Gait limited by fatigue. STS performed with CGA. Reviewed HEP. Will continue to progress strength and mobility as able.   Goal Outcome Evaluation:  Plan of Care Reviewed With: patient        Progress: improving  Outcome Summary: Pt increased ambulation distance to 170 feet using RW and CGA for safety. Gait limited by fatigue. STS performed with CGA. Reviewed HEP. Will continue to progress strength and mobility as able.

## 2021-09-16 NOTE — PROGRESS NOTES
Clinical Nutrition       Patient Name: Sissy Ballard  YOB: 1935  MRN: [id number]  Date of Encounter: 09/16/21 16:26 EDT  Admission date: 9/12/2021      Reason for Visit   LOS      EMR  Reviewed   Yes  Noted results of  SLP eval on 9/15 with recs for dysphagia level 4 with nectar thick liquids.       Reported/Observed/Food/Nutrition Related - Comments          Current Nutrition Prescription     Diet Dysphagia; IV - Mechanical Soft No Mixed Consistencies; Nectar / Syrup Thick; Cardiac, Consistent Carbohydrate        Average po intake:  94% @ past 8 meals.       Actions     Follow treatment progress  Goal: maintain po intake  Monitor Per Protocol      Shara Dumont, MS,RD,LD,   Time Spent: 15 mins

## 2021-09-16 NOTE — PROGRESS NOTES
[facility] Medicine Services  PROGRESS NOTE    Patient Name: Sissy Ballard  : 1935  MRN: [id number]    Date of Admission: 2021  Primary Care Physician: Paris Gibbs APRN    Subjective   Subjective     CC:  Follow-up acute ischemic stroke    HPI:  Patient seen and examined.  Nursing notes reviewed.  No acute events overnight.  NIH 2 this morning.  Patient doing well, sitting up in bedside chair.  She has no complaints.  Does not want to go to rehab, plan is home with home health.    ROS:  Gen- No fevers, chills  CV- No chest pain, palpitations  Resp- No cough, dyspnea  GI- No N/V/D, abd pain       Objective   Objective     Vital Signs:   Temp:  [97.7 °F (36.5 °C)-98.3 °F (36.8 °C)] 98.1 °F (36.7 °C)  Heart Rate:  [73-84] 75  Resp:  [16-18] 16  BP: (112-146)/(55-85) 119/57     Physical Exam:  Constitutional: No acute distress, awake, alert  HENT: NCAT, mucous membranes moist  Respiratory: Clear to auscultation bilaterally, respiratory effort normal   Cardiovascular: RRR, no murmurs, rubs, or gallops  Gastrointestinal: Positive bowel sounds, soft, nontender, nondistended  Musculoskeletal: No bilateral ankle edema  Psychiatric: Appropriate affect, cooperative  Neurologic: Oriented x 3, mild dysarthria, mild right facial droop  Skin: No rashes    Results Reviewed:  LAB RESULTS:      Lab 21  0406 09/15/21  0451 21  1212 21  1156 21  1154   WBC 8.03 9.31 8.31  --   --    HEMOGLOBIN 10.7* 11.0* 11.6*  --   --    HEMOGLOBIN, POC  --   --   --   --  14.3   HEMATOCRIT 32.9* 32.7* 35.3  --   --    HEMATOCRIT POC  --   --   --   --  42   PLATELETS 182 206 194  --   --    NEUTROS ABS  --   --  5.80  --   --    IMMATURE GRANS (ABS)  --   --  0.03  --   --    LYMPHS ABS  --   --  1.44  --   --    MONOS ABS  --   --  0.73  --   --    EOS ABS  --   --  0.26  --   --    MCV 84.4 83.2 84.7  --   --    PROTIME  --   --   --  11.8*  --    APTT  --   --  30.2*  --   --           Lab 09/16/21  0406 09/15/21  0451 09/13/21  0433 09/12/21  1154   SODIUM 134* 137 140  --    POTASSIUM 4.9 4.5 4.3  --    CHLORIDE 101 101 101  --    CO2 24.0 25.0 25.0  --    ANION GAP 9.0 11.0 14.0  --    BUN 34* 37* 31*  --    CREATININE 1.51* 1.71* 1.38* 1.50*   GLUCOSE 247* 211* 75  --    CALCIUM 9.0 9.0 9.7  --    MAGNESIUM  --  2.0  --   --    PHOSPHORUS 3.7  --   --   --    HEMOGLOBIN A1C  --   --  10.10*  --          Lab 09/16/21  0406 09/12/21  1212   ALBUMIN 3.20*  --    ALT (SGPT)  --  12   AST (SGOT)  --  17         Lab 09/12/21  1212 09/12/21  1156   TROPONIN T <0.010  --    PROTIME  --  11.8*   INR  --  1.0         Lab 09/13/21  0433   CHOLESTEROL 262*   LDL CHOL 170*   HDL CHOL 51   TRIGLYCERIDES 222*             Lab 09/12/21  1154   PH, ARTERIAL 7.45     Brief Urine Lab Results  (Last result in the past 365 days)      Color   Clarity   Blood   Leuk Est   Nitrite   Protein   CREAT   Urine HCG        09/13/21 0623 Yellow Clear Negative Large (3+) Positive 30 mg/dL (1+)               Microbiology Results Abnormal     Procedure Component Value - Date/Time    COVID PRE-OP / PRE-PROCEDURE SCREENING ORDER (NO ISOLATION) - Swab, Nasopharynx [[id number]]  (Normal) Collected: 09/12/21 1156    Lab Status: Final result Specimen: Swab from Nasopharynx Updated: 09/12/21 1239    Narrative:      The following orders were created for panel order COVID PRE-OP / PRE-PROCEDURE SCREENING ORDER (NO ISOLATION) - Swab, Nasopharynx.  Procedure                               Abnormality         Status                     ---------                               -----------         ------                     COVID-Dyan ABBOTT IN-HOUS...[[id number]]  Normal              Final result                 Please view results for these tests on the individual orders.    COVID-19 ABBOTT IN-HOUSE,NASAL Swab (NO TRANSPORT MEDIA) 2 HR TAT - Swab, Nasopharynx [[id number]]  (Normal) Collected: 09/12/21 1156    Lab Status: Final result  Specimen: Swab from Nasopharynx Updated: 09/12/21 1239     COVID19 Presumptive Negative    Narrative:      Fact sheet for providers: https://www.fda.gov/media/744296/download     Fact sheet for patients: https://www.fda.gov/media/713851/download    Test performed by PCR.  If inconsistent with clinical signs and symptoms patient should be tested with different authorized molecular test.          No radiology results from the last 24 hrs    Results for orders placed during the hospital encounter of 09/12/21    Adult Transthoracic Echo Complete W/ Cont if Necessary Per Protocol (With Agitated Saline)    Interpretation Summary  · Saline test results are negative.  · Estimated right ventricular systolic pressure from tricuspid regurgitation is normal (<35 mmHg).  · The following left ventricular wall segments are hypokinetic: basal anterolateral, basal inferolateral, mid inferolateral, apical inferior, mid inferior, basal inferoseptal, mid inferoseptal, basal anterior, basal inferior and basal inferoseptal. The following left ventricular wall segments are akinetic: mid anterior, apical anterior, mid anterolateral, apical lateral, apical septal, apex and mid anteroseptal.  · Estimated left ventricular EF = 40% Estimated left ventricular EF was in agreement with the calculated left ventricular EF. Left ventricular ejection fraction appears to be 36 - 40%. Left ventricular systolic function is mildly decreased.  · Left ventricular diastolic function is consistent with (grade II w/high LAP) pseudonormalization.  · Normal right ventricular cavity size, wall thickness, systolic function and septal motion noted.  · The aortic valve exhibits mild sclerosis.  · No evidence of pulmonary hypertension is present.  · There is no evidence of pericardial effusion.  · No evidence of a patent foramen ovale.      I have reviewed the medications:  Scheduled Meds:apixaban, 2.5 mg, Oral, Q12H  aspirin, 81 mg, Oral, Daily  atorvastatin, 80  mg, Oral, Nightly  insulin detemir, 25 Units, Subcutaneous, Daily  insulin lispro, 0-14 Units, Subcutaneous, 4x Daily With Meals & Nightly  insulin lispro, 8 Units, Subcutaneous, TID With Meals  levothyroxine, 50 mcg, Oral, Q AM  metoprolol tartrate, 25 mg, Oral, Q12H  pantoprazole, 40 mg, Oral, Q AM  sodium chloride, 10 mL, Intravenous, Q12H      Continuous Infusions:   PRN Meds:.•  acetaminophen  •  hydrALAZINE  •  metoprolol tartrate  •  ondansetron  •  sodium chloride    Assessment/Plan   Assessment & Plan     Active Hospital Problems    Diagnosis  POA   • **Ischemic cerebrovascular accident (CVA) (CMS/[facility]) [I63.9]  Yes   • Essential hypertension [I10]  Yes   • Type 2 diabetes mellitus (CMS/[facility]) [E11.9]  Yes   • Longstanding persistent atrial fibrillation (CMS/[facility]) [I48.11]  Yes   • Stage 3b chronic kidney disease (CMS/[facility]) [N18.32]  Yes   • Hypothyroidism (acquired) [E03.9]  Yes      Resolved Hospital Problems   No resolved problems to display.        Brief Hospital Course to date:  Sissy Ballard is a 85 y.o. female with diabetes, hypertension, atrial fibrillation, congestive heart failure, coronary artery disease, hypothyroidism, chronic kidney disease presenting with acute CVA, expressive aphasia and right-sided weakness.  She underwent thrombectomy with improvement in her NIH stroke score from a 10 to an 8.  Her Eliquis was apparently stopped in July.  Her baseline serum creatinine is 1.46.  Speech therapy cleared her for a dysphagia 4 diet.  She ambulated 150 feet with a rolling walker but was noted to have poor bilateral lower extremity coordination.  She had generalized weakness, decreased functional endurance and poor balance.    Patient transferred from ICU to telemetry on 9/15    This patient's problems and plans were partially entered by my partner and updated as appropriate by me 09/16/21.  All problems are new to me today    Acute ischemic stroke status post thrombectomy   -NIH remains at a  2  -Continue dysphagia 4 diet  -Presumed source is cardioembolic because her Eliquis was stopped and she could not get it refilled  -Cardiogram revealed no PFO, EF 40%     History of atrial fibrillation with possible tachybradycardia syndrome.    -Echocardiogram revealed some hypokinetic wall motion, and EF reduced at 36 to 40% with a sclerotic aortic valve and no PFO. -She is currently in sinus rhythm.      Diabetes mellitus type 2, poorly controlled with an A1c of 10.   -On Levemir at home  -Sugars remain high  -Increase Levemir to 30 units daily and add 10 units at night  -Increase Humalog to 12 units 3 times daily meals  -Continue moderate/high dose sliding scale insulin  -Diabetes educator has seen     Hypothyroidism  -Continue Synthroid     Chronic kidney disease stage IIIb   -Baseline creatinine 1.4  -Creatinine today 1.5, improved from yesterday 1.7  -Patient did receive contrast during admission, monitor  -Urine output adequate     E. coli UTI  -Status post 3 days of IV Rocephin     Hypertension  -Home metoprolol resumed yesterday, BP stable  -Continue to hold home lisinopril    DVT prophylaxis:  Medical and mechanical DVT prophylaxis orders are present.       AM-PAC 6 Clicks Score (PT): 20 (09/15/21 1351)    Disposition: I expect the patient to be discharged home tomorrow with home health this patient is refusing rehab.    CODE STATUS:   Code Status and Medical Interventions:   Ordered at: 09/13/21 0974     Code Status:    CPR     Medical Interventions (Level of Support Prior to Arrest):    Full       Vicenta Kirkpatrick, DO  09/16/21

## 2021-09-16 NOTE — CONSULTS
"Diabetes Education  Assessment/Teaching    Patient Name:  Sissy Ballard  YOB: 1935  MRN: [id number]  Admit Date:  9/12/2021      Assessment Date:  9/16/2021    Most Recent Value   General Information    Referral From:  Other (comment) [stroke]   Height  149.9 cm (59\")   Height Method  Estimated   Weight  68.3 kg (150 lb 9.2 oz)   Weight Method  Bed scale   Is patient pregnant?  no   Pregnancy Assessment   Diabetes History   What type of diabetes do you have?  Type 2   Length of Diabetes Diagnosis  6 - 10 years   Current DM knowledge  fair   Have you had diabetes education/teaching in the past?  yes   When and where was your diabetes education?  per PCP office   Do you test your blood sugar at home?  yes   Frequency of checks  1 day   Who performs the test?  self   Typical readings  200+   Have you had low blood sugar? (<70mg/dl)  no   Have you had high blood sugar? (>140mg/dl)  yes   How often do you have high blood sugar?  frequently   How would you rate your diabetes control?  poor   Education Preferences   What areas of diabetes would you like to learn about?  avoiding high blood sugar, exercise information, diet information, medications for diabetes   Nutrition Information   Assessment Topics   Healthy Eating - Assessment  Needs education   Being Active - Assessment  Needs education   Taking Medication - Assessment  Needs education   Problem Solving - Assessment  Needs education   Reducing Risk - Assessment  Needs education   Healthy Coping - Assessment  Needs education   Monitoring - Assessment  Needs education   DM Goals            Most Recent Value   DM Education Needs   Meter  Has own   Frequency of Testing  AC/HS   Blood Glucose Target Range  educated on ADA goal fasting and 2 hours postprandial   Medication  Insulin   Problem Solving  Hyperglycemia, Other (comment)   Physical Activity  None   Healthy Coping  Appropriate   Discharge Plan  Home   Motivation  Engaged   Teaching Method  " "Explanation, Handouts, Teach back   Patient Response  Verbalized understanding            Other Comments:   Discussed and taught patient about type 2 diabetes self-management, risk factors, and importance of blood glucose control to reduce complications. Target blood glucose readings and A1c goals per ADA were reviewed. Reviewed with patient current A1c 10.1 and discussed its significance. Signs, symptoms, and treatment of hyperglycemia and hypoglycemia were discussed. Lifestyle changes such as physical activity with MD approval and healthy eating were encouraged. Stressed the importance of strict blood sugar control after surgery to prevent complications such as infection and to promote healing of incision. Encouraged pt to monitor blood sugar at home 1+  times per day and to call PCP if blood sugar is trending rylan. Encouraged to keep record of blood glucose readings to take to follow up appointment with PCP. Provided patient with copy of [employer] \"What is Diabetes\" handout, \"Blood Glucose Goals\" handout, and \"What is A1c\" handout. Thank you for this referral.       Patient has been scheduled for outpatient diabetes education follow up visit on 9/21 at 0930 via phone. Outpatient staff will provide reminder call prior to appointment. Patient was given reminder card with date and time of appointment and our contact information.     Electronically signed by:  Michael Jackson RN  09/16/21 11:36 EDT  "

## 2021-09-16 NOTE — THERAPY TREATMENT NOTE
Patient Name: Sissy Ballard  : 1935    MRN: [id number]                              Today's Date: 2021       Admit Date: 2021    Visit Dx:     ICD-10-CM ICD-9-CM   1. Acute CVA (cerebrovascular accident) (CMS/HCC)  I63.9 434.91   2. Oropharyngeal dysphagia  R13.12 787.22   3. Cognitive communication deficit  R41.841 799.52     Patient Active Problem List   Diagnosis   • Ischemic cerebrovascular accident (CVA) (CMS/[facility])   • Essential hypertension   • Type 2 diabetes mellitus (CMS/HCC)   • Longstanding persistent atrial fibrillation (CMS/HCC)   • Stage 3b chronic kidney disease (CMS/HCC)   • Hypothyroidism (acquired)     Past Medical History:   Diagnosis Date   • Congestive heart failure (CHF) (CMS/[facility])    • Coronary artery disease     MI,  or    • Essential hypertension 2021   • Hypothyroid    • Hypothyroidism (acquired) 2021   • Nephrolithiasis    • Permanent atrial fibrillation (CMS/HCC) on Eliquis 2021   • Type 2 diabetes mellitus (CMS/HCC) 2021     Past Surgical History:   Procedure Laterality Date   • APPENDECTOMY      Performed at the time of her hysterectomy   • CARDIAC CATHETERIZATION       or  with stent she does not know details   • CATARACT EXTRACTION, BILATERAL     •  SECTION      X2   • INTERVENTIONAL RADIOLOGY PROCEDURE N/A 2021    Procedure: IR MECHANICAL THROMBECTOMY - PRIMARY;  Surgeon: Vaughn Dean MD;  Location: [facility] INVASIVE LOCATION;  Service: Interventional Radiology;  Laterality: N/A;   • TONSILLECTOMY     • TOTAL ABDOMINAL HYSTERECTOMY WITH SALPINGO OOPHORECTOMY      For ovarian cancer     General Information     Row Name 21 1440          Physical Therapy Time and Intention    Document Type  therapy note (daily note)  -HILDA     Mode of Treatment  physical therapy  -HILDA     Row Name 21 1440          General Information    Existing Precautions/Restrictions  fall;other (see comments) dysarthria, coordination  deficits  -     Row Name 09/16/21 1440          Cognition    Orientation Status (Cognition)  oriented x 3  -     Row Name 09/16/21 1440          Safety Issues, Functional Mobility    Safety Issues Affecting Function (Mobility)  awareness of need for assistance;safety precaution awareness;sequencing abilities  -     Impairments Affecting Function (Mobility)  balance;endurance/activity tolerance;strength;coordination;motor control  -       User Key  (r) = Recorded By, (t) = Taken By, (c) = Cosigned By    Initials Name Provider Type    Kj Salinas PT Physical Therapist        Mobility     Row Name 09/16/21 1440          Bed Mobility    Comment (Bed Mobility)  Received [facility]  -     Row Name 09/16/21 1440          Transfers    Comment (Transfers)  Verbal cues for safe hand placement during standing/sitting  -     Row Name 09/16/21 1440          Sit-Stand Transfer    Sit-Stand Moody (Transfers)  contact guard;verbal cues  -     Assistive Device (Sit-Stand Transfers)  walker, front-wheeled  -     Row Name 09/16/21 1440          Gait/Stairs (Locomotion)    Moody Level (Gait)  verbal cues;contact guard  -     Assistive Device (Gait)  walker, front-wheeled  -     Distance in Feet (Gait)  170  -HILDA     Deviations/Abnormal Patterns (Gait)  bilateral deviations;stephon decreased;gait speed decreased;stride length decreased  -HILDA     Bilateral Gait Deviations  forward flexed posture;heel strike decreased  -HILDA     Moody Level (Stairs)  not tested  -HILDA     Comment (Gait/Stairs)  Pt ambulated with step through pattern and decreased speed. Verbal cues for maintaining upright posture, body within walker, increase step length, and WB through LEs. Gait limited by fatigue.  -       User Key  (r) = Recorded By, (t) = Taken By, (c) = Cosigned By    Initials Name Provider Type    Kj Salinas PT Physical Therapist        Obj/Interventions     Row Name 09/16/21 1440          Motor Skills     Therapeutic Exercise  hip;knee;ankle  -[facility] Name 09/16/21 1440          Hip (Therapeutic Exercise)    Hip (Therapeutic Exercise)  isometric exercises;strengthening exercise  -     Hip Isometrics (Therapeutic Exercise)  gluteal sets;10 repetitions  -     Hip Strengthening (Therapeutic Exercise)  bilateral;aBduction;aDduction;10 repetitions  -[facility] Name 09/16/21 1440          Knee (Therapeutic Exercise)    Knee (Therapeutic Exercise)  isometric exercises;strengthening exercise  -     Knee Isometrics (Therapeutic Exercise)  quad sets;10 repetitions  -     Knee Strengthening (Therapeutic Exercise)  bilateral;heel slides;SLR (straight leg raise);LAQ (long arc quad);10 repetitions  -[facility] Name 09/16/21 1440          Ankle (Therapeutic Exercise)    Ankle (Therapeutic Exercise)  AROM (active range of motion)  -     Ankle AROM (Therapeutic Exercise)  bilateral;dorsiflexion;10 repetitions  -HILDA     Row Name 09/16/21 1440          Balance    Balance Assessment  standing static balance;standing dynamic balance  -     Static Standing Balance  WFL;supported;standing  -     Dynamic Standing Balance  WFL;supported;standing  -       User Key  (r) = Recorded By, (t) = Taken By, (c) = Cosigned By    Initials Name Provider Type    HILDA Kj Guzman, PT Physical Therapist        Goals/Plan    No documentation.       Clinical Impression     Row Name 09/16/21 1440          Pain    Additional Documentation  Pain Scale: Numbers Pre/Post-Treatment (Group)  -HILDA     Row Name 09/16/21 1440          Pain Scale: Numbers Pre/Post-Treatment    Pretreatment Pain Rating  0/10 - no pain  -     Posttreatment Pain Rating  0/10 - no pain  -HILDA     Row Name 09/16/21 1440          Therapy Assessment/Plan (PT)    Patient/Family Therapy Goals Statement (PT)  To return home  -     Rehab Potential (PT)  good, to achieve stated therapy goals  -     Criteria for Skilled Interventions Met (PT)  yes;meets criteria;skilled treatment  is necessary  -HILDA     Row Name 09/16/21 1440          Vital Signs    Pre Systolic BP Rehab  119  -HILDA     Pre Treatment Diastolic BP  57  -HILDA     Post Systolic BP Rehab  149  -HILDA     Post Treatment Diastolic BP  63  -HILDA     Row Name 09/16/21 1440          Positioning and Restraints    Pre-Treatment Position  sitting in chair/recliner  -HILDA     Post Treatment Position  chair  -HILDA     In Chair  notified nsg;reclined;call light within reach;encouraged to call for assist;exit alarm on;legs elevated  -       User Key  (r) = Recorded By, (t) = Taken By, (c) = Cosigned By    Initials Name Provider Type    Kj Salinas, PT Physical Therapist        Outcome Measures     Row Name 09/16/21 1440          How much help from another person do you currently need...    Turning from your back to your side while in flat bed without using bedrails?  4  -HILDA     Moving from lying on back to sitting on the side of a flat bed without bedrails?  4  -HILDA     Moving to and from a bed to a chair (including a wheelchair)?  3  -HILDA     Standing up from a chair using your arms (e.g., wheelchair, bedside chair)?  3  -HILDA     Climbing 3-5 steps with a railing?  3  -HILDA     To walk in hospital room?  3  -HILDA     AM-PAC 6 Clicks Score (PT)  20  -HILDA     Row Name 09/16/21 1440          Modified Sita Scale    Modified [place] Scale  2 - Slight disability.  Unable to carry out all previous activities but able to look after own affairs without assistance.  -     Row Name 09/16/21 1440          Functional Assessment    Outcome Measure Options  AM-PAC 6 Clicks Basic Mobility (PT)  -       User Key  (r) = Recorded By, (t) = Taken By, (c) = Cosigned By    Initials Name Provider Type    Kj Salinas, PT Physical Therapist                       Physical Therapy Education                 Title: PT OT SLP Therapies (In Progress)     Topic: Physical Therapy (Done)     Point: Mobility training (Done)     Learning Progress Summary           Patient Acceptance,  E,D, VU by HILDA at 9/16/2021 1440    Comment: Educated on safe sequencing with ambulatory transfers and gait. Reviewed HEP.    Acceptance, E,TB, VU,NR by AY at 9/13/2021 1504                   Point: Home exercise program (Done)     Learning Progress Summary           Patient Acceptance, E,D, VU by HILDA at 9/16/2021 1440    Comment: Educated on safe sequencing with ambulatory transfers and gait. Reviewed HEP.                   Point: Body mechanics (Done)     Learning Progress Summary           Patient Acceptance, E,D, VU by HILDA at 9/16/2021 1440    Comment: Educated on safe sequencing with ambulatory transfers and gait. Reviewed HEP.    Acceptance, E,TB, VU,NR by AY at 9/13/2021 1504                   Point: Precautions (Done)     Learning Progress Summary           Patient Acceptance, E,D, VU by HILDA at 9/16/2021 1440    Comment: Educated on safe sequencing with ambulatory transfers and gait. Reviewed HEP.    Acceptance, E,TB, VU,NR by AY at 9/13/2021 1504                               User Key     Initials Effective Dates Name Provider Type Discipline     06/16/21 -  Kj Guzman, PT Physical Therapist PT     11/10/20 -  Krystin Rutherford, PT Physical Therapist PT              PT Recommendation and Plan     Plan of Care Reviewed With: patient  Progress: improving  Outcome Summary: Pt increased ambulation distance to 170 feet using RW and CGA for safety. Gait limited by fatigue. STS performed with CGA. Reviewed HEP. Will continue to progress strength and mobility as able.     Time Calculation:   PT Charges     Row Name 09/16/21 1440             Time Calculation    Start Time  1440  -      PT Received On  09/16/21  -      PT Goal Re-Cert Due Date  09/23/21  -         Time Calculation- PT    Total Timed Code Minutes- PT  23 minute(s)  -         Timed Charges    85068 - PT Therapeutic Exercise Minutes  10  -      27164 - Gait Training Minutes   13  -         Total Minutes    Timed Charges Total Minutes  23  -HILDA        Total Minutes  23  -HILDA        User Key  (r) = Recorded By, (t) = Taken By, (c) = Cosigned By    Initials Name Provider Type    Kj Salinas, PT Physical Therapist        Therapy Charges for Today     Code Description Service Date Service Provider Modifiers Qty    87569762391  PT THER PROC EA 15 MIN 9/16/2021 Kj Guzman, PT GP 1    [id number]  GAIT TRAINING EA 15 MIN 9/16/2021 Kj Guzman, PT GP 1          PT G-Codes  Outcome Measure Options: AM-PAC 6 Clicks Basic Mobility (PT)  AM-PAC 6 Clicks Score (PT): 20  AM-PAC 6 Clicks Score (OT): 16  Modified Sita Scale: 2 - Slight disability.  Unable to carry out all previous activities but able to look after own affairs without assistance.    Kj Guzman, PT  9/16/2021

## 2021-09-17 ENCOUNTER — READMISSION MANAGEMENT (OUTPATIENT)
Dept: CALL CENTER | Facility: HOSPITAL | Age: 86
End: 2021-09-17

## 2021-09-17 ENCOUNTER — HOME HEALTH ADMISSION (OUTPATIENT)
Dept: HOME HEALTH SERVICES | Facility: HOME HEALTHCARE | Age: 86
End: 2021-09-17

## 2021-09-17 VITALS
SYSTOLIC BLOOD PRESSURE: 131 MMHG | OXYGEN SATURATION: 95 % | WEIGHT: 150.57 LBS | RESPIRATION RATE: 16 BRPM | TEMPERATURE: 97.8 F | HEART RATE: 74 BPM | DIASTOLIC BLOOD PRESSURE: 62 MMHG | HEIGHT: 59 IN | BODY MASS INDEX: 30.36 KG/M2

## 2021-09-17 LAB
ANION GAP SERPL CALCULATED.3IONS-SCNC: 9 MMOL/L (ref 5–15)
BUN SERPL-MCNC: 29 MG/DL (ref 8–23)
BUN/CREAT SERPL: 22.7 (ref 7–25)
CALCIUM SPEC-SCNC: 9.7 MG/DL (ref 8.6–10.5)
CHLORIDE SERPL-SCNC: 103 MMOL/L (ref 98–107)
CO2 SERPL-SCNC: 26 MMOL/L (ref 22–29)
CREAT SERPL-MCNC: 1.28 MG/DL (ref 0.57–1)
DEPRECATED RDW RBC AUTO: 51.1 FL (ref 37–54)
ERYTHROCYTE [DISTWIDTH] IN BLOOD BY AUTOMATED COUNT: 16.2 % (ref 12.3–15.4)
GFR SERPL CREATININE-BSD FRML MDRD: 40 ML/MIN/1.73
GLUCOSE BLDC GLUCOMTR-MCNC: 118 MG/DL (ref 70–130)
GLUCOSE BLDC GLUCOMTR-MCNC: 253 MG/DL (ref 70–130)
GLUCOSE SERPL-MCNC: 126 MG/DL (ref 65–99)
HCT VFR BLD AUTO: 35.3 % (ref 34–46.6)
HGB BLD-MCNC: 11.3 G/DL (ref 12–15.9)
MCH RBC QN AUTO: 27.5 PG (ref 26.6–33)
MCHC RBC AUTO-ENTMCNC: 32 G/DL (ref 31.5–35.7)
MCV RBC AUTO: 85.9 FL (ref 79–97)
PLATELET # BLD AUTO: 205 10*3/MM3 (ref 140–450)
PMV BLD AUTO: 11.4 FL (ref 6–12)
POTASSIUM SERPL-SCNC: 4.7 MMOL/L (ref 3.5–5.2)
RBC # BLD AUTO: 4.11 10*6/MM3 (ref 3.77–5.28)
SODIUM SERPL-SCNC: 138 MMOL/L (ref 136–145)
WBC # BLD AUTO: 8.39 10*3/MM3 (ref 3.4–10.8)

## 2021-09-17 PROCEDURE — 85027 COMPLETE CBC AUTOMATED: CPT | Performed by: FAMILY MEDICINE

## 2021-09-17 PROCEDURE — 99239 HOSP IP/OBS DSCHRG MGMT >30: CPT | Performed by: NURSE PRACTITIONER

## 2021-09-17 PROCEDURE — 82962 GLUCOSE BLOOD TEST: CPT

## 2021-09-17 PROCEDURE — 80048 BASIC METABOLIC PNL TOTAL CA: CPT | Performed by: FAMILY MEDICINE

## 2021-09-17 PROCEDURE — 63710000001 INSULIN LISPRO (HUMAN) PER 5 UNITS: Performed by: FAMILY MEDICINE

## 2021-09-17 PROCEDURE — 63710000001 INSULIN LISPRO (HUMAN) PER 5 UNITS: Performed by: INTERNAL MEDICINE

## 2021-09-17 PROCEDURE — 97116 GAIT TRAINING THERAPY: CPT

## 2021-09-17 PROCEDURE — 97110 THERAPEUTIC EXERCISES: CPT

## 2021-09-17 RX ORDER — ATORVASTATIN CALCIUM 80 MG/1
80 TABLET, FILM COATED ORAL NIGHTLY
Qty: 30 TABLET | Refills: 0 | Status: SHIPPED | OUTPATIENT
Start: 2021-09-17

## 2021-09-17 RX ADMIN — ASPIRIN 81 MG CHEWABLE TABLET 81 MG: 81 TABLET CHEWABLE at 08:22

## 2021-09-17 RX ADMIN — APIXABAN 2.5 MG: 2.5 TABLET, FILM COATED ORAL at 08:22

## 2021-09-17 RX ADMIN — INSULIN LISPRO 8 UNITS: 100 INJECTION, SOLUTION INTRAVENOUS; SUBCUTANEOUS at 12:24

## 2021-09-17 RX ADMIN — INSULIN LISPRO 12 UNITS: 100 INJECTION, SOLUTION INTRAVENOUS; SUBCUTANEOUS at 12:24

## 2021-09-17 RX ADMIN — LEVOTHYROXINE SODIUM 50 MCG: 50 TABLET ORAL at 05:43

## 2021-09-17 RX ADMIN — METOPROLOL TARTRATE 25 MG: 25 TABLET, FILM COATED ORAL at 08:22

## 2021-09-17 RX ADMIN — PANTOPRAZOLE SODIUM 40 MG: 40 TABLET, DELAYED RELEASE ORAL at 05:43

## 2021-09-17 NOTE — DISCHARGE SUMMARY
[facility] Medicine Services  DISCHARGE SUMMARY    Patient Name: Sissy Ballard  : 1935  MRN: [id number]    Date of Admission: 2021 11:42 AM  Date of Discharge:  2021  Primary Care Physician: Paris Gibbs APRN    Consults     Date and Time Order Name Status Description    2021 11:44 AM Inpatient Neurology Consult Stroke Completed           Hospital Course     Presenting Problem:   Acute CVA (cerebrovascular accident) (CMS/HCC) [I63.9]    Active Hospital Problems    Diagnosis  POA   • **Ischemic cerebrovascular accident (CVA) (CMS/HCC) [I63.9]  Yes   • Essential hypertension [I10]  Yes   • Type 2 diabetes mellitus (CMS/[facility]) [E11.9]  Yes   • Longstanding persistent atrial fibrillation (CMS/[facility]) [I48.11]  Yes   • Stage 3b chronic kidney disease (CMS/[facility]) [N18.32]  Yes   • Hypothyroidism (acquired) [E03.9]  Yes      Resolved Hospital Problems   No resolved problems to display.          Hospital Course:  Sissy Ballard is a 85 y.o. female  with diabetes, hypertension, atrial fibrillation, congestive heart failure, coronary artery disease, hypothyroidism, chronic kidney disease presenting with acute CVA, expressive aphasia and right-sided weakness.  She underwent thrombectomy with improvement in her NIH stroke score from a 10 to an 8.  Her Eliquis was apparently stopped in July.  Her baseline serum creatinine is 1.46.  Speech therapy cleared her for a dysphagia 4 diet.  She ambulated 150 feet with a rolling walker but was noted to have poor bilateral lower extremity coordination.  She had generalized weakness, decreased functional endurance and poor balance.     Patient transferred from ICU to telemetry on 9/15     Acute ischemic stroke status post thrombectomy   -NIH remains at a 2  -Continue dysphagia 4 diet  -Presumed source is cardioembolic because her Eliquis was stopped and she could not get it refilled. Continue eliquis  -Cardiogram revealed no PFO, EF  40%     History of atrial fibrillation with possible tachybradycardia syndrome.    -Echocardiogram revealed some hypokinetic wall motion, and EF reduced at 36 to 40% with a sclerotic aortic valve and no PFO. -She is currently in sinus rhythm.      Diabetes mellitus type 2, poorly controlled with an A1c of 10.   -continue on home insulin regimen and follow up with PCP.   -Diabetes educator has seen     Hypothyroidism  -Continue Synthroid     Chronic kidney disease stage IIIb   -Baseline creatinine 1.4  -Creatinine down to 1.28  -Patient did receive contrast during admissionr  -Urine output adequate     E. coli UTI  -Status post 3 days of IV Rocephin     Hypertension  -Home metoprolol resumed yesterday, BP stable  -Continue to hold home lisinopril at discharge.       Discharge Follow Up Recommendations for outpatient labs/diagnostics:   Follow up with PCP, first available hospital follow up   Follow up with stroke neuro clinic.     Day of Discharge     HPI:   Resting comfortably in bed. No overnight events. Eager to discharge home.     Review of Systems  Gen- No fevers, chills  CV- No chest pain, palpitations  Resp- No cough, dyspnea  GI- No N/V/D, abd pain        Vital Signs:   Temp:  [97.8 °F (36.6 °C)-98.1 °F (36.7 °C)] 97.8 °F (36.6 °C)  Heart Rate:  [68-86] 74  Resp:  [14-18] 16  BP: (120-163)/(56-74) 131/62     Physical Exam:  Constitutional: No acute distress, awake, alert  HENT: NCAT, mucous membranes moist  Respiratory: Clear to auscultation bilaterally, respiratory effort normal   Cardiovascular: RRR, no murmurs, rubs, or gallops  Gastrointestinal: Positive bowel sounds, soft, nontender, nondistended  Musculoskeletal: No bilateral ankle edema  Psychiatric: Appropriate affect, cooperative  Neurologic: Oriented x 3, mild right facial droop, mild dysarthria  Skin: No rashes      Pertinent  and/or Most Recent Results     LAB RESULTS:      Lab 09/17/21  0807 09/16/21  0406 09/15/21  0451 09/12/21  1212  09/12/21  1156 09/12/21  1154   WBC 8.39 8.03 9.31 8.31  --   --    HEMOGLOBIN 11.3* 10.7* 11.0* 11.6*  --   --    HEMOGLOBIN, POC  --   --   --   --   --  14.3   HEMATOCRIT 35.3 32.9* 32.7* 35.3  --   --    HEMATOCRIT POC  --   --   --   --   --  42   PLATELETS 205 182 206 194  --   --    NEUTROS ABS  --   --   --  5.80  --   --    IMMATURE GRANS (ABS)  --   --   --  0.03  --   --    LYMPHS ABS  --   --   --  1.44  --   --    MONOS ABS  --   --   --  0.73  --   --    EOS ABS  --   --   --  0.26  --   --    MCV 85.9 84.4 83.2 84.7  --   --    PROTIME  --   --   --   --  11.8*  --    APTT  --   --   --  30.2*  --   --          Lab 09/17/21  0807 09/16/21  0406 09/15/21  0451 09/13/21  0433 09/12/21  1154   SODIUM 138 134* 137 140  --    POTASSIUM 4.7 4.9 4.5 4.3  --    CHLORIDE 103 101 101 101  --    CO2 26.0 24.0 25.0 25.0  --    ANION GAP 9.0 9.0 11.0 14.0  --    BUN 29* 34* 37* 31*  --    CREATININE 1.28* 1.51* 1.71* 1.38* 1.50*   GLUCOSE 126* 247* 211* 75  --    CALCIUM 9.7 9.0 9.0 9.7  --    MAGNESIUM  --   --  2.0  --   --    PHOSPHORUS  --  3.7  --   --   --    HEMOGLOBIN A1C  --   --   --  10.10*  --          Lab 09/16/21  0406 09/12/21  1212   ALBUMIN 3.20*  --    ALT (SGPT)  --  12   AST (SGOT)  --  17         Lab 09/12/21  1212 09/12/21  1156   TROPONIN T <0.010  --    PROTIME  --  11.8*   INR  --  1.0         Lab 09/13/21  0433   CHOLESTEROL 262*   LDL CHOL 170*   HDL CHOL 51   TRIGLYCERIDES 222*             Lab 09/12/21  1154   PH, ARTERIAL 7.45     Brief Urine Lab Results  (Last result in the past 365 days)      Color   Clarity   Blood   Leuk Est   Nitrite   Protein   CREAT   Urine HCG        09/13/21 0623 Yellow Clear Negative Large (3+) Positive 30 mg/dL (1+)             Microbiology Results (last 10 days)     Procedure Component Value - Date/Time    Urine Culture - Urine, Urine, Clean Catch [[id number]]  (Abnormal)  (Susceptibility) Collected: 09/13/21 0623    Lab Status: Final result Specimen: Urine,  Clean Catch Updated: 09/15/21 1015     Urine Culture >100,000 CFU/mL Escherichia coli      25,000 CFU/mL Mixed Paul Isolated    Narrative:      Specimen contains mixed organisms of questionable pathogenicity which indicates contamination with commensal paul.  Further identification is unlikely to provide clinically useful information.  Suggest recollection.    Susceptibility      Escherichia coli      BOBY      Ampicillin Susceptible      Ampicillin + Sulbactam Susceptible      Cefazolin Susceptible      Cefepime Susceptible      Ceftazidime Susceptible      Ceftriaxone Susceptible      Gentamicin Susceptible      Levofloxacin Susceptible      Nitrofurantoin Susceptible      Piperacillin + Tazobactam Susceptible      Tetracycline Susceptible      Trimethoprim + Sulfamethoxazole Susceptible               Linear View                   COVID PRE-OP / PRE-PROCEDURE SCREENING ORDER (NO ISOLATION) - Swab, Nasopharynx [[id number]]  (Normal) Collected: 09/12/21 1156    Lab Status: Final result Specimen: Swab from Nasopharynx Updated: 09/12/21 1239    Narrative:      The following orders were created for panel order COVID PRE-OP / PRE-PROCEDURE SCREENING ORDER (NO ISOLATION) - Swab, Nasopharynx.  Procedure                               Abnormality         Status                     ---------                               -----------         ------                     COVID-19, ABBOTT IN-HOUS...[[id number]]  Normal              Final result                 Please view results for these tests on the individual orders.    COVID-19, ABBOTT IN-HOUSE,NASAL Swab (NO TRANSPORT MEDIA) 2 HR TAT - Swab, Nasopharynx [[id number]]  (Normal) Collected: 09/12/21 1156    Lab Status: Final result Specimen: Swab from Nasopharynx Updated: 09/12/21 1239     COVID19 Presumptive Negative    Narrative:      Fact sheet for providers: https://www.fda.gov/media/783718/download     Fact sheet for patients:  https://www.fda.gov/media/244138/download    Test performed by PCR.  If inconsistent with clinical signs and symptoms patient should be tested with different authorized molecular test.          Adult Transthoracic Echo Complete W/ Cont if Necessary Per Protocol (With Agitated Saline)    Result Date: 9/13/2021  · Saline test results are negative. · Estimated right ventricular systolic pressure from tricuspid regurgitation is normal (<35 mmHg). · The following left ventricular wall segments are hypokinetic: basal anterolateral, basal inferolateral, mid inferolateral, apical inferior, mid inferior, basal inferoseptal, mid inferoseptal, basal anterior, basal inferior and basal inferoseptal. The following left ventricular wall segments are akinetic: mid anterior, apical anterior, mid anterolateral, apical lateral, apical septal, apex and mid anteroseptal. · Estimated left ventricular EF = 40% Estimated left ventricular EF was in agreement with the calculated left ventricular EF. Left ventricular ejection fraction appears to be 36 - 40%. Left ventricular systolic function is mildly decreased. · Left ventricular diastolic function is consistent with (grade II w/high LAP) pseudonormalization. · Normal right ventricular cavity size, wall thickness, systolic function and septal motion noted. · The aortic valve exhibits mild sclerosis. · No evidence of pulmonary hypertension is present. · There is no evidence of pericardial effusion. · No evidence of a patent foramen ovale.      CT Head Without Contrast    Result Date: 9/13/2021  EXAMINATION: CT HEAD WO CONTRAST-09/13/2021:  INDICATION: F/UP L M2 CVA; I63.9-Cerebral infarction, unspecified.  TECHNIQUE: Axial noncontrast CT of the head performed per dual-energy protocol with energy specific iodine map, virtual noncontrast and combined dual-energy sequences post processed.  The radiation dose reduction device was turned on for each scan per the ALARA (As Low as Reasonably  Achievable) protocol.  COMPARISON: NONE.  FINDINGS: Normal symmetric cerebral hemispheres without evidence for midline shift or hydrocephalus. Dual-energy evaluation without evidence for iodinated contrast extravasation or hyperdense material to suggest hemorrhage. No extra-axial fluid collection. Globes and orbits are unremarkable. The paranasal sinuses reveal opacification of the right maxillary sinus, otherwise grossly clear.      No evidence for hyperdense material to suggest hemorrhage or contrast extravasation/staining. No midline shift or hydrocephalus.  D:  09/13/2021 E:  09/13/2021  This report was finalized on 9/13/2021 4:54 PM by Dr. Nitin Rodriguez.      CT Angiogram Neck    Result Date: 9/13/2021  EXAMINATION: CT ANGIOGRAM HEAD W AI ANALYSIS OF LVO, CT ANGIOGRAM NECK - 09/12/2021  INDICATION: Aphasia.  TECHNIQUE: Multiple axial CT imaging is obtained of the head and neck following the administration of intravenous contrast according to the CT angiographic protocol. 3-D reformatted images were submitted to further facilitate diagnostic accuracy and treatment planning.  The radiation dose reduction device was turned on for each scan per the ALARA (As Low as Reasonably Achievable) protocol.  AI analysis of LVO was utilized.  COMPARISON: None  FINDINGS: Lung apices are clear. Thoracic aortic arch is unremarkable. Both common carotid arteries are without significant stenosis. There is no significant atherosclerotic disease in either of the carotid bifurcations. The vertebral arteries are symmetric in size with some tortuosity with no significant stenosis present. The distal vertebral arteries are without significant narrowing. The basilar artery is patent. Both posterior cerebral arteries are intact and unremarkable.  The intracranial internal carotid arteries reveal mild atherosclerotic disease. The right middle cerebral artery is intact and unremarkable in appearance. No significant stenosis identified. The  left middle cerebral artery reveals no significant stenosis in the M1 or M2 branches. There is a homogeneously enhancing mass in the left temporal region anteriorly suggesting possibly a small meningioma measuring 1.1 cm. Both anterior cerebral arteries are unremarkable in appearance.      No significant stenosis of the vessels within the head or neck. There is a small 1.1 cm homogeneously enhancing mass anteriorly within the left temporal region suggesting a meningioma. No evidence of visualized intracranial stenosis.  DICTATED:   09/12/2021 EDITED/ls :   09/12/2021  This report was finalized on 9/13/2021 3:54 PM by Dr. Jenna Richards MD.      MRI Brain Without Contrast    Result Date: 9/12/2021  MRI Brain WO INDICATION: Stroke follow-up exam, left M2 CVA status post thrombectomy TECHNIQUE: MRI of the brain without IV contrast. COMPARISON:  CT head, CTA head and neck, and CT cerebral perfusion protocol 9/12/2021 FINDINGS: Patchy restricted diffusion involving the left frontal lobe and a portion of the left insula is consistent with reported history of left M2 occlusion. Overall involved area is maximally measuring 3.1 cm. No additional sites of restricted diffusion to suggest additional vascular territory infarct. No hydrocephalus. Rounded intermediate T2 intensity along the left anterior temporal pole correlates with homogeneously enhancing focus on the CTA of the head and neck and most likely represents a small meningioma measuring up to 1.1 cm. There are scattered T2/FLAIR signal hyperintensities within the bilateral cerebral and deep white matter which are nonspecific but most commonly associated with chronic microvascular ischemic change. Midline structures are within normal limits. Normal calvarial marrow signal. Mucosal thickening within the right maxillary antrum and patchy fluid in the right mastoid air cells.     1.  Findings of left frontal/insular acute/subacute infarct, consistent with history of  "left M2 occlusion. 2.  Probable small meningioma along the left anterior temporal pole. Signer Name: CHAU GHOTRA MD  Signed: 9/12/2021 9:29 PM  Workstation Name: [email or web address]  [facility]    Cardiac Catheterization/Vascular Study    Addendum Date: 9/12/2021    Clinical Indication: [age]-year-old female with baseline MRS of 0, with known anticoagulation (on Eliquis) who presented to [facility] emergency department with expressive aphasia and right-sided weakness.  She presented as a \"wake-up\" stroke, and thus was not a candidate for IV thrombolytic therapy.  CTA/CT perfusion demonstrated occlusion of a M2 branch of the left MCA, with substantial ischemic penumbra and favorable ASPECTS.  She was offered mechanical thrombectomy and emergent neuro intervention on a compassionate care/salvage basis. : Dr. Vaughn Dean. Access: Right common femoral artery. Estimated blood loss: Less than 50 mL Complication: None apparent. Procedures: 1.  Mechanical thrombectomy (Solitaire stent retriever), left middle cerebral artery (MCA) 2.  Placement of an arterial closure device. Technique: The patient was brought emergently to the cardiac catheterization suite.  The right groin region was prepped and draped in the usual, sterile fashion.  Local anesthesia with 1% lidocaine infiltration was achieved.  Utilizing micropuncture technique, a 8 Spanish vascular sheath was placed into the right common femoral artery without difficulty.  Subsequently, a icomasoftenstein catheter was advanced into the aortic arch and used to select the left common carotid and internal carotid arteries under fluoroscopic guidance.  Appropriate angiographic sequences were filmed following contrast injection.  Findings: Selective left internal carotid artery catheterization and carotid angiography: The cervical portions of the left carotid vasculature demonstrate no appreciable atherosclerotic plaque formation, at the common carotid " "bifurcation.  There are no cervical dissections were ulcerative lesions.  No \"culprit\" lesions are identified.  The intracranial circulation was opacified via a left internal carotid injection, demonstrating abrupt occlusion of the superior division (M2 branch) of the left MCA bifurcation, consistent with an acute thromboembolic event.  This represents TICI 2A flow to the entirety of the left MCA vascular territory.  No additional large vessel occlusions are identified.  There are no changes of vasculitis or vasospasm.  The dural venous sinuses are patent. The Berenstein catheter was then exchanged over a Mathis wire for a 8F FlowGate balloon guide catheter which was advanced into the high cervical left internal carotid artery without difficulty. Under fluoroscopic guidance, a Buzzwire 21 microcatheter was advanced over Transcend EX soft tip microwire into the intracranial circulation and passed the left MCA occlusion without difficulty.  Microcatheter injection confirms appropriate placement within an M2 branch distal to the embolism.  Next, a 3 mm Solitaire stent retriever was advanced through the microcatheter and deployed across the left MCA occlusion without difficulty.  After approximately 3-5 minute wait period, the solitaire device and microcatheter were removed utilizing temporary balloon occlusion technique and vigorous aspiration of the balloon guide catheter.  This resulted in successful extraction of a sizable thrombus.  Follow-up angiography demonstrates restoration of normal flow (TICI 3) to the left MCA vascular territory.  There were no emboli to new vascular territory identified.  No complicating features.  At the end of the procedure, all catheters and sheaths were removed from the groin and hemostasis was achieved at the puncture site utilizing manual compression and placement of a 8 Danish Angio-Seal arterial closure device. Impression: Abrupt occlusion of the superior division (M2 branch) of the " "left middle cerebral artery, consistent with an acute thromboembolic event.  This responded well to mechanical thrombectomy (Solitaire stent retriever), restoring normal flow (TICI 2B) to the left cerebral hemisphere.  There were no emboli to new vascular territory or complicating features.  There was no carotid \"culprit\" lesion, and the aforementioned thromboembolism is concerning for a cardiac thromboembolic source.    Result Date: 9/12/2021  Clinical Indication: [age]-year-old female with baseline MRS of 0, with known anticoagulation (on Eliquis) who presented to [facility] emergency department with expressive aphasia and right-sided weakness.  She presented as a \"wake-up\" stroke, and thus was not a candidate for IV thrombolytic therapy.  CTA/CT perfusion demonstrated occlusion of a M2 branch of the left MCA, with substantial ischemic penumbra and favorable ASPECTS.  She was offered mechanical thrombectomy and emergent neuro intervention on a compassionate care/salvage basis. : Dr. Vaughn Dean. Access: Right common femoral artery. Estimated blood loss: Less than 50 mL Complication: None apparent. Procedures: 1.  Mechanical thrombectomy (Solitaire stent retriever), left middle cerebral artery (MCA) 2.  Placement of an arterial closure device. Technique: The patient was brought emergently to the cardiac catheterization suite.  The right groin region was prepped and draped in the usual, sterile fashion.  Local anesthesia with 1% lidocaine infiltration was achieved.  Additionally, intravenous fentanyl and Versed were given for patient comfort throughout the procedure, the details of which are documented in the nursing record.  Utilizing micropuncture technique, a 8 Omani vascular sheath was placed into the right common femoral artery without difficulty.  Subsequently, a [employer]enstein catheter was advanced into the aortic arch and used to select the left common carotid and internal carotid arteries under " "fluoroscopic guidance.  Appropriate angiographic sequences were filmed following contrast injection.  Findings: Selective left internal carotid artery catheterization and carotid angiography: The cervical portions of the left carotid vasculature demonstrate no appreciable atherosclerotic plaque formation, at the common carotid bifurcation.  There are no cervical dissections were ulcerative lesions.  No \"culprit\" lesions are identified.  The intracranial circulation was opacified via a left internal carotid injection, demonstrating abrupt occlusion of the superior division (M2 branch) of the left MCA bifurcation, consistent with an acute thromboembolic event.  This represents TICI 2A flow to the entirety of the left MCA vascular territory.  No additional large vessel occlusions are identified.  There are no changes of vasculitis or vasospasm.  The dural venous sinuses are patent. The Berenstein catheter was then exchanged over a Mathis wire for a 8F FlowGate balloon guide catheter which was advanced into the high cervical left internal carotid artery without difficulty. Under fluoroscopic guidance, a CPG Softom 21 microcatheter was advanced over Transcend EX soft tip microwire into the intracranial circulation and passed the left MCA occlusion without difficulty.  Microcatheter injection confirms appropriate placement within an M2 branch distal to the embolism.  Next, a 3 mm Solitaire stent retriever was advanced through the microcatheter and deployed across the left MCA occlusion without difficulty.  After approximately 3-5 minute wait period, the solitaire device and microcatheter were removed utilizing temporary balloon occlusion technique and vigorous aspiration of the balloon guide catheter.  This resulted in successful extraction of a sizable thrombus.  Follow-up angiography demonstrates restoration of normal flow (TICI 3) to the left MCA vascular territory.  There were no emboli to new vascular territory " "identified.  No complicating features.  At the end of the procedure, all catheters and sheaths were removed from the groin and hemostasis was achieved at the puncture site utilizing manual compression and placement of a 8 Upper sorbian Angio-Seal arterial closure device. Impression: Abrupt occlusion of the superior division (M2 branch) of the left middle cerebral artery, consistent with an acute thromboembolic event.  This responded well to mechanical thrombectomy (Solitaire stent retriever), restoring normal flow (TICI 2B) to the left cerebral hemisphere.  There were no emboli to new vascular territory or complicating features.  There was no carotid \"culprit\" lesion, and the aforementioned thromboembolism is concerning for a cardiac thromboembolic source.    SLP FEES - Fiberoptic Endo Eval Swallow    Result Date: 9/13/2021  This procedure was auto-finalized with no dictation required.    US Renal Limited    Result Date: 9/13/2021  EXAMINATION: US RENAL LIMITED-  INDICATION: Renal insufficiency, diabetic, rule out obstruction; I63.9-Cerebral infarction, unspecified.  TECHNIQUE: Ultrasound kidneys and urinary bladder.  COMPARISON: None.  FINDINGS: Right kidney measures 8.3 cm in length without evidence of hydronephrosis, contour deforming mass or obvious calculi.  Left kidney measures 8.7 cm in length without evidence of hydronephrosis, contour deforming mass or obvious calculi.  Urinary bladder is decompressed and unremarkable.      No acute sonographic findings within the visualized kidneys specifically no overt hydronephrosis. Increased echogenicity bilateral of medical renal disease involvement.  D:  09/13/2021 E:  09/13/2021    This report was finalized on 9/13/2021 4:54 PM by Dr. Nitin Rodriguez.      CT Head Without Contrast Stroke Protocol    Result Date: 9/13/2021  EXAMINATION: CT HEAD WO CONTRAST  - 09/12/2021  INDICATION: Stroke symptoms, ectasia.  TECHNIQUE: Axial CT imaging is obtained of the head from skull base " to skull vertex without the administration of intravenous contrast. Stroke protocol.  The radiation dose reduction device was turned on for each scan per the ALARA (As Low as Reasonably Achievable) protocol.  COMPARISON: None  FINDINGS: Brain parenchyma reveals mild atrophy. Low-density area seen in the periventricular and subcortical white matter. No hemorrhage or hydrocephalus. No mass, mass effect, or midline shift. No abnormal extra-axial fluid collections identified. Mucosal thickening filling the right maxillary sinus. Mastoid air cells are patent. Remainder of the visualized paranasal sinuses are clear. Globes and orbits are intact.      Atrophy and chronic changes seen within the brain with no acute intracranial normality. Chronic right maxillary sinusitis.  Examination was performed 09/12/2021 at 11:43 AM; examination results were given to the ER physician 09/12/2021 at 11:49 AM.  DICTATED:   09/12/2021 EDITED/ls :   09/12/2021   This report was finalized on 9/13/2021 3:53 PM by Dr. Jenna Richards MD.      CT Angiogram Head w AI Analysis of LVO    Result Date: 9/13/2021  EXAMINATION: CT ANGIOGRAM HEAD W AI ANALYSIS OF LVO, CT ANGIOGRAM NECK - 09/12/2021  INDICATION: Aphasia.  TECHNIQUE: Multiple axial CT imaging is obtained of the head and neck following the administration of intravenous contrast according to the CT angiographic protocol. 3-D reformatted images were submitted to further facilitate diagnostic accuracy and treatment planning.  The radiation dose reduction device was turned on for each scan per the ALARA (As Low as Reasonably Achievable) protocol.  AI analysis of LVO was utilized.  COMPARISON: None  FINDINGS: Lung apices are clear. Thoracic aortic arch is unremarkable. Both common carotid arteries are without significant stenosis. There is no significant atherosclerotic disease in either of the carotid bifurcations. The vertebral arteries are symmetric in size with some tortuosity with no  significant stenosis present. The distal vertebral arteries are without significant narrowing. The basilar artery is patent. Both posterior cerebral arteries are intact and unremarkable.  The intracranial internal carotid arteries reveal mild atherosclerotic disease. The right middle cerebral artery is intact and unremarkable in appearance. No significant stenosis identified. The left middle cerebral artery reveals no significant stenosis in the M1 or M2 branches. There is a homogeneously enhancing mass in the left temporal region anteriorly suggesting possibly a small meningioma measuring 1.1 cm. Both anterior cerebral arteries are unremarkable in appearance.      No significant stenosis of the vessels within the head or neck. There is a small 1.1 cm homogeneously enhancing mass anteriorly within the left temporal region suggesting a meningioma. No evidence of visualized intracranial stenosis.  DICTATED:   09/12/2021 EDITED/ls :   09/12/2021  This report was finalized on 9/13/2021 3:54 PM by Dr. Jenna Richards MD.      CT CEREBRAL PERFUSION WITH & WITHOUT CONTRAST    Result Date: 9/13/2021  EXAMINATION: CT CEREBRAL PERFUSION WWO CONTRAST - 09/12/2021  INDICATION: Right-sided weakness, ectasia. Evaluate for stroke.  TECHNIQUE: Cerebral perfusion analysis was performed using computed tomography with contrast administration, including post processing of parametric maps with determination of cerebral blood flow, cerebral blood volume, and mean transit time.  The radiation dose reduction device was turned on for each scan per the ALARA (As Low as Reasonably Achievable) protocol.  COMPARISON: None  FINDINGS: There is decreased cerebral blood flow identified within the left parietal lobe and some diminished blood volume centrally with what appears to be reversible ischemia identified in the periphery of the area of infarction. There is mean transit time to suggest an occlusion or stenosis in one of the  left middle  cerebral artery branches. There is mismatch volume of 19 mL.      Small area of reversible ischemia seen in the periphery of the area of insult in one of the branches of the left MCA territory.  DICTATED:   09/12/2021 EDITED/ls :   09/12/2021  This report was finalized on 9/13/2021 3:54 PM by Dr. Jenna Richards MD.                Results for orders placed during the hospital encounter of 09/12/21    Adult Transthoracic Echo Complete W/ Cont if Necessary Per Protocol (With Agitated Saline)    Interpretation Summary  · Saline test results are negative.  · Estimated right ventricular systolic pressure from tricuspid regurgitation is normal (<35 mmHg).  · The following left ventricular wall segments are hypokinetic: basal anterolateral, basal inferolateral, mid inferolateral, apical inferior, mid inferior, basal inferoseptal, mid inferoseptal, basal anterior, basal inferior and basal inferoseptal. The following left ventricular wall segments are akinetic: mid anterior, apical anterior, mid anterolateral, apical lateral, apical septal, apex and mid anteroseptal.  · Estimated left ventricular EF = 40% Estimated left ventricular EF was in agreement with the calculated left ventricular EF. Left ventricular ejection fraction appears to be 36 - 40%. Left ventricular systolic function is mildly decreased.  · Left ventricular diastolic function is consistent with (grade II w/high LAP) pseudonormalization.  · Normal right ventricular cavity size, wall thickness, systolic function and septal motion noted.  · The aortic valve exhibits mild sclerosis.  · No evidence of pulmonary hypertension is present.  · There is no evidence of pericardial effusion.  · No evidence of a patent foramen ovale.      Plan for Follow-up of Pending Labs/Results:     Discharge Details        Discharge Medications      Changes to Medications      Instructions Start Date   atorvastatin 80 MG tablet  Commonly known as: LIPITOR  What changed:    · medication strength  · how much to take  · when to take this   80 mg, Oral, Nightly         Continue These Medications      Instructions Start Date   apixaban 2.5 MG tablet tablet  Commonly known as: ELIQUIS   2.5 mg, Oral, 2 Times Daily      aspirin 81 MG chewable tablet   81 mg, Oral, Daily      insulin glargine 100 UNIT/ML injection  Commonly known as: LANTUS, SEMGLEE   35 Units, Subcutaneous, Daily      levothyroxine 50 MCG tablet  Commonly known as: SYNTHROID, LEVOTHROID   25 mcg, Oral, Daily      metoprolol tartrate 25 MG tablet  Commonly known as: LOPRESSOR   25 mg, Oral, 2 Times Daily      multivitamin and minerals liquid liquid   Oral, Daily      pantoprazole 40 MG EC tablet  Commonly known as: PROTONIX   40 mg, Oral, Daily      torsemide 20 MG tablet  Commonly known as: DEMADEX   20 mg, Oral, Daily         Stop These Medications    lisinopril 5 MG tablet  Commonly known as: PRINIVIL,ZESTRIL            No Known Allergies      Discharge Disposition:  [place] [facility]iet:  Hospital:  Diet Order   Procedures   • Diet Dysphagia; IV - Mechanical Soft No Mixed Consistencies; Nectar / Syrup Thick; Cardiac, Consistent Carbohydrate       Activity:  Activity Instructions     Activity as Tolerated            Restrictions or Other Recommendations:         CODE STATUS:    Code Status and Medical Interventions:   Ordered at: 09/13/21 0941     Code Status:    CPR     Medical Interventions (Level of Support Prior to Arrest):    Full       Future Appointments   Date Time Provider Department Center   9/21/2021  9:30 AM [facility]       Additional Instructions for the Follow-ups that You Need to Schedule     Ambulatory Referral to Home Health   As directed      Face to Face Visit Date: 9/17/2021    Follow-up provider for Plan of Care?: I treated the patient in an acute care facility and will not continue treatment after discharge.    Follow-up provider: ANGIE GAVIRIA [[id number]]    Reason/Clinical  Findings: s/p hospitalization for CVA    Describe mobility limitations that make leaving home difficult: Impaired functional mobility, balance, gait and endurance.    Nursing/Therapeutic Services Requested: Physical Therapy Occupational Therapy Speech Therapy    PT orders: Therapeutic exercise Gait Training Transfer training Strengthening Home safety assessment    Weight Bearing Status: As Tolerated    Occupational orders: Activities of daily living Energy conservation Strengthening    SLP orders: Dysphagia therapy Articulation Language Voice    Frequency: Other         Discharge Follow-up with PCP   As directed       Currently Documented PCP:    Paris Gibbs APRN    PCP Phone Number:    [phone]     Follow Up Details: first available hospital follow up         Discharge Follow-up with Specified Provider: neurology stroke clinic   As directed      To: neurology stroke clinic                     TAYLOR Eden  09/17/21      Time Spent on Discharge:  I spent  40 minutes on this discharge activity which included: face-to-face encounter with the patient, reviewing the data in the system, coordination of the care with the nursing staff as well as consultants, documentation, and entering orders.

## 2021-09-17 NOTE — PLAN OF CARE
Goal Outcome Evaluation:  Plan of Care Reviewed With: patient        Progress: improving  Outcome Summary: Patient ambulating 220 feet with her walker . She demonstrated good control with no loss of balance

## 2021-09-17 NOTE — THERAPY TREATMENT NOTE
Patient Name: Sissy Ballard  : 1935    MRN: [id number]                              Today's Date: 2021       Admit Date: 2021    Visit Dx:     ICD-10-CM ICD-9-CM   1. Acute CVA (cerebrovascular accident) (CMS/HCC)  I63.9 434.91   2. Oropharyngeal dysphagia  R13.12 787.22   3. Cognitive communication deficit  R41.841 799.52   4. Ischemic cerebrovascular accident (CVA) (CMS/HCC)  I63.9 434.91   5. Essential hypertension  I10 401.9   6. Longstanding persistent atrial fibrillation (CMS/HCC)  I48.11 427.31     Patient Active Problem List   Diagnosis   • Ischemic cerebrovascular accident (CVA) (CMS/HCC)   • Essential hypertension   • Type 2 diabetes mellitus (CMS/HCC)   • Longstanding persistent atrial fibrillation (CMS/HCC)   • Stage 3b chronic kidney disease (CMS/HCC)   • Hypothyroidism (acquired)     Past Medical History:   Diagnosis Date   • Congestive heart failure (CHF) (CMS/[facility])    • Coronary artery disease     MI,  or    • Essential hypertension 2021   • Hypothyroid    • Hypothyroidism (acquired) 2021   • Nephrolithiasis    • Permanent atrial fibrillation (CMS/HCC) on Eliquis 2021   • Type 2 diabetes mellitus (CMS/HCC) 2021     Past Surgical History:   Procedure Laterality Date   • APPENDECTOMY      Performed at the time of her hysterectomy   • CARDIAC CATHETERIZATION       or  with stent she does not know details   • CATARACT EXTRACTION, BILATERAL     •  SECTION      X2   • INTERVENTIONAL RADIOLOGY PROCEDURE N/A 2021    Procedure: IR MECHANICAL THROMBECTOMY - PRIMARY;  Surgeon: Vaughn Dean MD;  Location: [facility] INVASIVE LOCATION;  Service: Interventional Radiology;  Laterality: N/A;   • TONSILLECTOMY     • TOTAL ABDOMINAL HYSTERECTOMY WITH SALPINGO OOPHORECTOMY      For ovarian cancer     General Information     Row Name 21 1404          Physical Therapy Time and Intention    Document Type  therapy note (daily note)  -SC     Mode of  Treatment  physical therapy  -SC     Row Name 09/17/21 1403          General Information    Patient Profile Reviewed  yes  -SC     Existing Precautions/Restrictions  fall;other (see comments) dysarthria  -SC     Row Name 09/17/21 1403          Cognition    Orientation Status (Cognition)  oriented x 3  -[facility] Name 09/17/21 1403          Safety Issues, Functional Mobility    Impairments Affecting Function (Mobility)  balance;endurance/activity tolerance;strength;coordination;motor control  -SC     Comment, Safety Issues/Impairments (Mobility)  alert, following commands  -SC       User Key  (r) = Recorded By, (t) = Taken By, (c) = Cosigned By    Initials Name Provider Type    SC Pito Ty, PT Physical Therapist        Mobility     Row Name 09/17/21 1404          Bed Mobility    Bed Mobility  supine-sit  -SC     Supine-Sit [place] (Bed Mobility)  modified independence;verbal cues  -SC     Assistive Device (Bed Mobility)  bed rails;head of bed elevated  -SC     Comment (Bed Mobility)  slow transfer up to edge of bed  -[facility] Name 09/17/21 1404          Transfers    Comment (Transfers)  Demonstrated slow careful transfer with good techniqauje  -[facility] Name 09/17/21 1404          Sit-Stand Transfer    Sit-Stand [place] (Transfers)  modified independence  -SC     Assistive Device (Sit-Stand Transfers)  walker, front-wheeled  -[facility] Name 09/17/21 1404          Gait/Stairs (Locomotion)    [place] Level (Gait)  standby assist  -SC     Assistive Device (Gait)  walker, front-wheeled  -SC     Distance in Feet (Gait)  200  -SC     Deviations/Abnormal Patterns (Gait)  bilateral deviations;stephon decreased;gait speed decreased;stride length decreased  -SC     Bilateral Gait Deviations  forward flexed posture;heel strike decreased  -SC     Comment (Gait/Stairs)  Gt training focused on controling walker in hallway. Cues to stay closer and for upright posure. Unable to sustain this posture. No  LOB noted  -SC       User Key  (r) = Recorded By, (t) = Taken By, (c) = Cosigned By    Initials Name Provider Type    SC Pito Ty PT Physical Therapist        Obj/Interventions     Row Name 09/17/21 1406          Motor Skills    Therapeutic Exercise  hip  -[facility] Name 09/17/21 1406          Hip (Therapeutic Exercise)    Hip (Therapeutic Exercise)  AROM (active range of motion)  -SC     Hip Isometrics (Therapeutic Exercise)  bilateral;flexion;extension;aBduction;aDduction;10 second hold;sitting;standing sideways stepping in standing to R and L  -[facility] Name 09/17/21 1406          Knee (Therapeutic Exercise)    Knee Strengthening (Therapeutic Exercise)  bilateral;flexion;extension;10 repetitions  -[facility] Name 09/17/21 1406          Balance    Dynamic Standing Balance  mild impairment;supported  -SC     Comment, Balance  needs  walker  -SC       User Key  (r) = Recorded By, (t) = Taken By, (c) = Cosigned By    Initials Name Provider Type    SC Pito Ty PT Physical Therapist        Goals/Plan     [facility] Name 09/17/21 1410          Bed Mobility Goal 1 (PT)    Activity/Assistive Device (Bed Mobility Goal 1, PT)  sit to supine/supine to sit  -SC     [place] Level/Cues Needed (Bed Mobility Goal 1, PT)  independent  -SC     Time Frame (Bed Mobility Goal 1, PT)  long term goal (LTG);2 weeks  -SC     Progress/Outcomes (Bed Mobility Goal 1, PT)  goal met with railing  -SC     Row Name 09/17/21 1410          Transfer Goal 1 (PT)    Activity/Assistive Device (Transfer Goal 1, PT)  sit-to-stand/stand-to-sit  -SC     [place] Level/Cues Needed (Transfer Goal 1, PT)  independent  -SC     Time Frame (Transfer Goal 1, PT)  long term goal (LTG);2 weeks  -SC     Progress/Outcome (Transfer Goal 1, PT)  goal met;other (see comments) met with walker  -SC     Row Name 09/17/21 1410          Gait Training Goal 1 (PT)    Activity/Assistive Device (Gait Training Goal 1, PT)  gait (walking locomotion)  -SC      [place] Level (Gait Training Goal 1, PT)  independent  -SC     Distance (Gait Training Goal 1, PT)  250  -SC     Time Frame (Gait Training Goal 1, PT)  long term goal (LTG);2 weeks  -SC     Progress/Outcome (Gait Training Goal 1, PT)  goal met;other (see comments) met with walker  -SC       User Key  (r) = Recorded By, (t) = Taken By, (c) = Cosigned By    Initials Name Provider Type    SC Pito Ty, PT Physical Therapist        Clinical Impression     [facility] Name 09/17/21 1409          Pain    Additional Documentation  Pain Scale: FACES Pre/Post-Treatment (Group)  -SC     Row Name 09/17/21 1409          Pain Scale: Numbers Pre/Post-Treatment    Pretreatment Pain Rating  0/10 - no pain  -SC     Posttreatment Pain Rating  0/10 - no pain  -SC     Row Name 09/17/21 1409          Plan of Care Review    Plan of Care Reviewed With  patient  -SC     Progress  improving  -SC     Outcome Summary  Patient ambulating 220 feet with her walker . She demonstrated good control with no loss of balance  -SC     Row Name 09/17/21 1409          Therapy Assessment/Plan (PT)    Patient/Family Therapy Goals Statement (PT)  go home  -SC     Rehab Potential (PT)  good, to achieve stated therapy goals  -SC     Criteria for Skilled Interventions Met (PT)  yes;meets criteria;skilled treatment is necessary  -SC     Row Name 09/17/21 1409          Vital Signs    Post Systolic BP Rehab  152  -SC     Post Treatment Diastolic BP  70  -SC     Row Name 09/17/21 1409          Positioning and Restraints    Pre-Treatment Position  in bed  -SC     Post Treatment Position  chair  -SC     In Chair  sitting;RUE elevated;encouraged to call for assist;exit alarm on  -SC       User Key  (r) = Recorded By, (t) = Taken By, (c) = Cosigned By    Initials Name Provider Type    SC Pito Ty, PT Physical Therapist        Outcome Measures     Row Name 09/17/21 1411          How much help from another person do you currently need...    Turning from your  back to your side while in flat bed without using bedrails?  4  -SC     Moving from lying on back to sitting on the side of a flat bed without bedrails?  4  -SC     Moving to and from a bed to a chair (including a wheelchair)?  3  -SC     Standing up from a chair using your arms (e.g., wheelchair, bedside chair)?  3  -SC     Climbing 3-5 steps with a railing?  3  -SC     To walk in hospital room?  3  -SC     AM-PAC 6 Clicks Score (PT)  20  -SC     Row Name 09/17/21 1411          Functional Assessment    Outcome Measure Options  AM-PAC 6 Clicks Basic Mobility (PT)  -SC       User Key  (r) = Recorded By, (t) = Taken By, (c) = Cosigned By    Initials Name Provider Type    SC Pito Ty PT Physical Therapist                       Physical Therapy Education                 Title: PT OT SLP Therapies (In Progress)     Topic: Physical Therapy (Done)     Point: Mobility training (Done)     Learning Progress Summary           Patient EagTANGELA hsu, VU by SC at 9/17/2021 1412    Comment: reviewed benefits of activity    Acceptance, E,D, VU by HILDA at 9/16/2021 1440    Comment: Educated on safe sequencing with ambulatory transfers and gait. Reviewed HEP.    Acceptance, E,TB, VU,NR by ERYNA at 9/13/2021 1504                   Point: Home exercise program (Done)     Learning Progress Summary           Patient Eager, E, VU by SC at 9/17/2021 1412    Comment: reviewed benefits of activity    Acceptance, E,D, VU by HILDA at 9/16/2021 1440    Comment: Educated on safe sequencing with ambulatory transfers and gait. Reviewed HEP.                   Point: Body mechanics (Done)     Learning Progress Summary           Patient Eager, E, VU by SC at 9/17/2021 1412    Comment: reviewed benefits of activity    Acceptance, E,D, VU by HILDA at 9/16/2021 1440    Comment: Educated on safe sequencing with ambulatory transfers and gait. Reviewed HEP.    Acceptance, E,TB, VU,NR by REYNA at 9/13/2021 1504                   Point: Precautions (Done)     Learning  Progress Summary           Patient Eager, TANGELA VU by SC at 9/17/2021 1412    Comment: reviewed benefits of activity    Acceptance, TANGELA,D, VU by  at 9/16/2021 1440    Comment: Educated on safe sequencing with ambulatory transfers and gait. Reviewed HEP.    Acceptance, E,TB, VU,NR by  at 9/13/2021 1504                               User Key     Initials Effective Dates Name Provider Type Discipline    SC 06/16/21 -  Pito Ty, PT Physical Therapist PT    HILDA 06/16/21 -  Kj Guzman, PT Physical Therapist PT    REYNA 11/10/20 -  Krystin Rutherford, PT Physical Therapist PT              PT Recommendation and Plan     Plan of Care Reviewed With: patient  Progress: improving  Outcome Summary: Patient ambulating 220 feet with her walker . She demonstrated good control with no loss of balance     Time Calculation:   PT Charges     Row Name 09/17/21 1328             Time Calculation    Start Time  1328  -SC      PT Received On  09/17/21  -SC         Time Calculation- PT    Total Timed Code Minutes- PT  30 minute(s)  -SC         Timed Charges    95901 - PT Therapeutic Exercise Minutes  5  -SC      37177 - Gait Training Minutes   15  -SC      43064 - PT Therapeutic Activity Minutes  3  -SC         Total Minutes    Timed Charges Total Minutes  23  -SC       Total Minutes  23  -SC        User Key  (r) = Recorded By, (t) = Taken By, (c) = Cosigned By    Initials Name Provider Type    SC Pito Ty, PT Physical Therapist        Therapy Charges for Today     Code Description Service Date Service Provider Modifiers Qty    89637853566 HC PT THER PROC EA 15 MIN 9/17/2021 Pito Ty, PT GP 1    34884544298 HC GAIT TRAINING EA 15 MIN 9/17/2021 Pito Ty, PT GP 1          PT G-Codes  Outcome Measure Options: AM-PAC 6 Clicks Basic Mobility (PT)  AM-PAC 6 Clicks Score (PT): 20  AM-PAC 6 Clicks Score (OT): 16  Modified [place] Scale: 2 - Slight disability.  Unable to carry out all previous activities but able to look after own  affairs without assistance.    Pito Ty, PT  9/17/2021

## 2021-09-17 NOTE — CASE MANAGEMENT/SOCIAL WORK
Discharge Planning Assessment  [facility]     Patient Name: Sissy Ballard  MRN: [id number]  Today's Date: 9/17/2021    Admit Date: 9/12/2021    Discharge Needs Assessment    No documentation.       Discharge Plan     Row Name 09/17/21 1146       Plan    Plan  Home with family asisstance and [facility]    Patient/Family in Agreement with Plan  yes    Plan Comments  Followed up with Ms. Ballard at at the bedside for discharge planning.  Ms. Ballard is being discharged to home today.  She states that she will have assistance from her son, that lives with her, as well as other family members.  Ms. Ballard has been referred to [facility] for PT, OT and SLP.  Notified Jamari with [facility] of the patient's discharge.  The patient denies any additional DME needs in the home.   Ms. Ballard son, Charley, will be transporting her home.    CM will continue to follow.    Final Discharge Disposition Code  06 - home with home health care    Row Name 09/17/21 0918       Plan    Final Discharge Disposition Code  06 - home with home health care        Continued Care and Services - Admitted Since 9/12/2021     Home Medical Care Coordination complete.    Service Provider Request Status Selected Services Address Phone Fax Patient Preferred     [facility]   [facility] [phone] [facility] [facility] [address] [id number] [phone] --              Expected Discharge Date and Time     Expected Discharge Date Expected Discharge Time    Sep 17, 2021         Demographic Summary    No documentation.       Functional Status    No documentation.       Psychosocial    No documentation.       Abuse/Neglect    No documentation.       Legal    No documentation.       Substance Abuse    No documentation.       Patient Forms    No documentation.           Janice Waters RN

## 2021-09-17 NOTE — PLAN OF CARE
Problem: Adult Inpatient Plan of Care  Goal: Plan of Care Review  Outcome: Ongoing, Progressing  Goal: Patient-Specific Goal (Individualized)  Outcome: Ongoing, Progressing  Goal: Absence of Hospital-Acquired Illness or Injury  Outcome: Ongoing, Progressing  Intervention: Identify and Manage Fall Risk  Recent Flowsheet Documentation  Taken 9/17/2021 1600 by Christie Amin RN  Safety Promotion/Fall Prevention:   activity supervised   toileting scheduled   safety round/check completed   nonskid shoes/slippers when out of bed   fall prevention program maintained   assistive device/personal items within reach  Taken 9/17/2021 1200 by Christie Amin RN  Safety Promotion/Fall Prevention:   activity supervised   safety round/check completed   toileting scheduled   nonskid shoes/slippers when out of bed   fall prevention program maintained  Taken 9/17/2021 1000 by Christie Amin RN  Safety Promotion/Fall Prevention:   activity supervised   toileting scheduled   safety round/check completed   nonskid shoes/slippers when out of bed   fall prevention program maintained   assistive device/personal items within reach  Taken 9/17/2021 0820 by Christie Amin RN  Safety Promotion/Fall Prevention:   activity supervised   toileting scheduled   safety round/check completed   nonskid shoes/slippers when out of bed   gait belt   fall prevention program maintained   assistive device/personal items within reach  Intervention: Prevent Skin Injury  Recent Flowsheet Documentation  Taken 9/17/2021 0820 by Christie Amin RN  Body Position: position changed independently  Intervention: Prevent and Manage VTE (venous thromboembolism) Risk  Recent Flowsheet Documentation  Taken 9/17/2021 1600 by Christie Amin RN  VTE Prevention/Management:   bilateral   sequential compression devices off  Taken 9/17/2021 1200 by Christie Amin RN  VTE Prevention/Management: sequential compression devices off  Taken 9/17/2021 1000 by  Christie Amin, RN  VTE Prevention/Management: patient refused intervention  Taken 9/17/2021 0820 by Christie Amin, RN  VTE Prevention/Management:   bilateral   sequential compression devices off  Goal: Optimal Comfort and Wellbeing  Outcome: Ongoing, Progressing  Goal: Readiness for Transition of Care  Outcome: Ongoing, Progressing   Goal Outcome Evaluation:

## 2021-09-18 NOTE — OUTREACH NOTE
Prep Survey      Responses   [facility] facility patient discharged from?  [facility]   Is LACE score < 7 ?  No   Emergency Room discharge w/ pulse ox?  No   Eligibility  Readm Mgmt   Discharge diagnosis  CVA   Does the patient have one of the following disease processes/diagnoses(primary or secondary)?  Stroke (TIA)   Does the patient have Home health ordered?  Yes   What is the Home health agency?   [facility]   Is there a DME ordered?  No   Prep survey completed?  Yes          ETTA Lockhart RN

## 2021-09-20 ENCOUNTER — HOME CARE VISIT (OUTPATIENT)
Dept: HOME HEALTH SERVICES | Facility: HOME HEALTHCARE | Age: 86
End: 2021-09-20

## 2021-09-20 PROCEDURE — G0152 HHCP-SERV OF OT,EA 15 MIN: HCPCS

## 2021-09-21 ENCOUNTER — READMISSION MANAGEMENT (OUTPATIENT)
Dept: CALL CENTER | Facility: HOSPITAL | Age: 86
End: 2021-09-21

## 2021-09-21 ENCOUNTER — EDUCATION (OUTPATIENT)
Dept: DIABETES SERVICES | Facility: HOSPITAL | Age: 86
End: 2021-09-21

## 2021-09-21 ENCOUNTER — HOSPITAL ENCOUNTER (OUTPATIENT)
Dept: DIABETES SERVICES | Facility: HOSPITAL | Age: 86
Setting detail: RECURRING SERIES
Discharge: HOME | End: 2021-09-21

## 2021-09-21 ENCOUNTER — HOME CARE VISIT (OUTPATIENT)
Dept: HOME HEALTH SERVICES | Facility: HOME HEALTHCARE | Age: 86
End: 2021-09-21

## 2021-09-21 VITALS
OXYGEN SATURATION: 96 % | HEART RATE: 80 BPM | RESPIRATION RATE: 16 BRPM | SYSTOLIC BLOOD PRESSURE: 142 MMHG | TEMPERATURE: 95.6 F | DIASTOLIC BLOOD PRESSURE: 80 MMHG

## 2021-09-21 NOTE — OUTREACH NOTE
Stroke Week 1 Survey      Responses   [facility] patient discharged from?  [place]   Does the patient have one of the following disease processes/diagnoses(primary or secondary)?  Stroke (TIA)   Week 1 attempt successful?  Yes   Call start time  1013   Call end time  1016   Discharge diagnosis  CVA   Meds reviewed with patient/caregiver?  Yes   Is the patient having any side effects they believe may be caused by any medication additions or changes?  No   Does the patient have all medications ordered at discharge?  Yes   Is the patient taking all medications as directed (includes completed medication regime)?  Yes   Does the patient have a primary care provider?   Yes   Does the patient have an appointment with their PCP within 7 days of discharge?  Yes   Comments regarding PCP  f/u with PCP on 9/23   Has the patient kept scheduled appointments due by today?  Yes   Comments  had a f/u appt with diabetes educator this morning   What is the Home health agency?   [facility]   Has home health visited the patient within 72 hours of discharge?  Yes   Psychosocial issues?  No   Does the patient require any assistance with activities of daily living such as eating, bathing, dressing, walking, etc.?  Yes   ADL comments  uses a cane and rolling walker to assist with ambulation   Does the patient have any residual symptoms from stroke/TIA?  Yes   Does the patient understand the diet ordered at discharge?  Yes   Did the patient receive a copy of their discharge instructions?  Yes   Nursing interventions  Reviewed instructions with patient   What is the patient's perception of their health status since discharge?  Improving   Nursing interventions  Nurse provided patient education   Is the patient able to teach back FAST for Stroke?  Yes   Is the patient/caregiver able to teach back the risk factors for a stroke?  Diabetes   Is the patient/caregiver able to teach back signs and symptoms related to disease process  for when to call PCP?  Yes   Is the patient/caregiver able to teach back signs and symptoms related to disease process for when to call 911?  Yes   Is the patient/caregiver able to teach back the hierarchy of who to call/visit for symptoms/problems? PCP, Specialist, Home health nurse, Urgent Care, ED, 911  Yes   Week 1 call completed?  Yes   Wrap up additional comments  Says she is doing well, following he discharge instructions closely, no questions or concerns at this time.          Ashley Bernal RN

## 2021-09-21 NOTE — CONSULTS
"Sissy Ballard  attended follow up outpatient diabetes management education class via phone for a 30 minute class. Educated on basic diabetes pathophysiology and how related to increased risk for complications, specifically stroke. Discussed s/sx of hyperglycemia and hypoglycemia and self management of both. Education provided on TLC diet, the plate method, as well as exercise recommendations. Stressed importance of ongoing, lifelong follow up with PCP for diabetes and other chronic health condition management. Stressed importance of taking all medications as prescribed. Patient was given opportunity to ask questions, and was also encouraged to pursue further diabetes education class with both RN and RD. Class educational materials were mailed to patient's home: [facility]'s \"Diabetes Basics\" booklet}, as well as our contact information for any further questions or needs.   "

## 2021-09-22 ENCOUNTER — HOME CARE VISIT (OUTPATIENT)
Dept: HOME HEALTH SERVICES | Facility: HOME HEALTHCARE | Age: 86
End: 2021-09-22

## 2021-09-22 NOTE — CASE COMMUNICATION
Per OT request, I called patient and son (Laci) re: the program that can possibly pay someone as a caregiver (PDS, participant directed support) through [employer].  I left a message with the information and phone number to call on Laci's phone.  I also left my number in case I can be of further assistance.

## 2021-09-23 ENCOUNTER — HOME CARE VISIT (OUTPATIENT)
Dept: HOME HEALTH SERVICES | Facility: HOME HEALTHCARE | Age: 86
End: 2021-09-23

## 2021-09-23 VITALS
OXYGEN SATURATION: 99 % | HEART RATE: 98 BPM | DIASTOLIC BLOOD PRESSURE: 72 MMHG | RESPIRATION RATE: 18 BRPM | SYSTOLIC BLOOD PRESSURE: 115 MMHG

## 2021-09-23 VITALS
DIASTOLIC BLOOD PRESSURE: 60 MMHG | HEART RATE: 93 BPM | OXYGEN SATURATION: 98 % | SYSTOLIC BLOOD PRESSURE: 140 MMHG | RESPIRATION RATE: 16 BRPM

## 2021-09-23 PROCEDURE — G0151 HHCP-SERV OF PT,EA 15 MIN: HCPCS

## 2021-09-23 PROCEDURE — G0153 HHCP-SVS OF S/L PATH,EA 15MN: HCPCS

## 2021-09-24 ENCOUNTER — HOME CARE VISIT (OUTPATIENT)
Dept: HOME HEALTH SERVICES | Facility: HOME HEALTHCARE | Age: 86
End: 2021-09-24

## 2021-09-24 PROCEDURE — G0152 HHCP-SERV OF OT,EA 15 MIN: HCPCS

## 2021-09-24 NOTE — HOME HEALTH
Sissy Ballard is a 85 y.o. female with h/o diabetes, hypertension, atrial fibrillation, congestive heart failure, coronary artery disease, hypothyroidism, chronic kidney disease who persented to [facility] with acute CVA, expressive aphasia and right-sided weakness. She underwent thrombectomy with improvement in her NIH stroke score from a 10 to an 8. Her Eliquis was apparently stopped in July. She d/c'd home with her son and was referred to [facility] for further care.

## 2021-09-25 VITALS
DIASTOLIC BLOOD PRESSURE: 62 MMHG | TEMPERATURE: 94.6 F | HEART RATE: 73 BPM | OXYGEN SATURATION: 98 % | SYSTOLIC BLOOD PRESSURE: 118 MMHG

## 2021-09-27 ENCOUNTER — HOME CARE VISIT (OUTPATIENT)
Dept: HOME HEALTH SERVICES | Facility: HOME HEALTHCARE | Age: 86
End: 2021-09-27

## 2021-09-27 VITALS
OXYGEN SATURATION: 98 % | TEMPERATURE: 97.6 F | HEART RATE: 79 BPM | SYSTOLIC BLOOD PRESSURE: 107 MMHG | DIASTOLIC BLOOD PRESSURE: 62 MMHG | RESPIRATION RATE: 16 BRPM

## 2021-09-27 PROCEDURE — G0151 HHCP-SERV OF PT,EA 15 MIN: HCPCS

## 2021-09-29 ENCOUNTER — HOME CARE VISIT (OUTPATIENT)
Dept: HOME HEALTH SERVICES | Facility: HOME HEALTHCARE | Age: 86
End: 2021-09-29

## 2021-09-29 PROCEDURE — G0152 HHCP-SERV OF OT,EA 15 MIN: HCPCS

## 2021-09-30 VITALS
OXYGEN SATURATION: 96 % | TEMPERATURE: 98.1 F | DIASTOLIC BLOOD PRESSURE: 81 MMHG | SYSTOLIC BLOOD PRESSURE: 107 MMHG | HEART RATE: 74 BPM

## 2021-10-01 ENCOUNTER — HOME CARE VISIT (OUTPATIENT)
Dept: HOME HEALTH SERVICES | Facility: HOME HEALTHCARE | Age: 86
End: 2021-10-01

## 2021-10-01 PROCEDURE — G0153 HHCP-SVS OF S/L PATH,EA 15MN: HCPCS

## 2021-10-06 ENCOUNTER — HOME CARE VISIT (OUTPATIENT)
Dept: HOME HEALTH SERVICES | Facility: HOME HEALTHCARE | Age: 86
End: 2021-10-06

## 2021-10-06 VITALS
HEART RATE: 75 BPM | TEMPERATURE: 98 F | SYSTOLIC BLOOD PRESSURE: 114 MMHG | DIASTOLIC BLOOD PRESSURE: 63 MMHG | OXYGEN SATURATION: 96 %

## 2021-10-06 PROCEDURE — G0152 HHCP-SERV OF OT,EA 15 MIN: HCPCS

## 2021-10-07 ENCOUNTER — HOME CARE VISIT (OUTPATIENT)
Dept: HOME HEALTH SERVICES | Facility: HOME HEALTHCARE | Age: 86
End: 2021-10-07

## 2021-10-07 VITALS
TEMPERATURE: 96.4 F | SYSTOLIC BLOOD PRESSURE: 83 MMHG | OXYGEN SATURATION: 96 % | HEART RATE: 81 BPM | DIASTOLIC BLOOD PRESSURE: 51 MMHG | RESPIRATION RATE: 16 BRPM

## 2021-10-07 PROCEDURE — G0151 HHCP-SERV OF PT,EA 15 MIN: HCPCS

## 2021-10-11 ENCOUNTER — HOME CARE VISIT (OUTPATIENT)
Dept: HOME HEALTH SERVICES | Facility: HOME HEALTHCARE | Age: 86
End: 2021-10-11

## 2021-10-11 PROCEDURE — G0152 HHCP-SERV OF OT,EA 15 MIN: HCPCS

## 2021-10-12 VITALS
SYSTOLIC BLOOD PRESSURE: 126 MMHG | TEMPERATURE: 96.4 F | OXYGEN SATURATION: 95 % | HEART RATE: 81 BPM | DIASTOLIC BLOOD PRESSURE: 70 MMHG

## 2021-10-13 ENCOUNTER — HOME CARE VISIT (OUTPATIENT)
Dept: HOME HEALTH SERVICES | Facility: HOME HEALTHCARE | Age: 86
End: 2021-10-13

## 2021-10-13 VITALS
HEART RATE: 77 BPM | OXYGEN SATURATION: 94 % | RESPIRATION RATE: 16 BRPM | SYSTOLIC BLOOD PRESSURE: 112 MMHG | TEMPERATURE: 96.5 F | DIASTOLIC BLOOD PRESSURE: 56 MMHG

## 2021-10-13 PROCEDURE — G0157 HHC PT ASSISTANT EA 15: HCPCS

## 2021-10-21 ENCOUNTER — HOME CARE VISIT (OUTPATIENT)
Dept: HOME HEALTH SERVICES | Facility: HOME HEALTHCARE | Age: 86
End: 2021-10-21

## 2021-10-21 VITALS
DIASTOLIC BLOOD PRESSURE: 66 MMHG | SYSTOLIC BLOOD PRESSURE: 107 MMHG | TEMPERATURE: 98.1 F | HEART RATE: 78 BPM | RESPIRATION RATE: 16 BRPM | OXYGEN SATURATION: 98 %

## 2021-10-21 PROCEDURE — G0151 HHCP-SERV OF PT,EA 15 MIN: HCPCS

## 2021-10-21 NOTE — HOME HEALTH
"Patient reports that she has been walking more, has \"done some of the exercises.  Education provided to pt today re: fall prevention and importance of use of an AD in her home, pt is resistant to using.  Next visit plan to advance HEP to dynamic balance tasks with EO/EC."

## 2021-10-26 ENCOUNTER — HOME CARE VISIT (OUTPATIENT)
Dept: HOME HEALTH SERVICES | Facility: HOME HEALTHCARE | Age: 86
End: 2021-10-26

## 2021-10-26 VITALS
OXYGEN SATURATION: 99 % | TEMPERATURE: 97.8 F | HEART RATE: 84 BPM | DIASTOLIC BLOOD PRESSURE: 62 MMHG | SYSTOLIC BLOOD PRESSURE: 99 MMHG | RESPIRATION RATE: 18 BRPM

## 2021-10-26 PROCEDURE — G0151 HHCP-SERV OF PT,EA 15 MIN: HCPCS

## 2021-10-27 NOTE — HOME HEALTH
Pt reports she has had mild dizziness earlier this week, has mostly resolved.  Pt participated in gait training indoors and outdoors, level and uneven surfaces with FWW used outside; pt provided with cues/education re: heel to toe pattern, upright posture and forward gaze, improved pattern following instruction.  Plan to d/c HHPT next visit if appropriate.

## 2021-11-01 ENCOUNTER — HOME CARE VISIT (OUTPATIENT)
Dept: HOME HEALTH SERVICES | Facility: HOME HEALTHCARE | Age: 86
End: 2021-11-01

## 2021-11-01 VITALS
SYSTOLIC BLOOD PRESSURE: 93 MMHG | TEMPERATURE: 97.5 F | DIASTOLIC BLOOD PRESSURE: 56 MMHG | HEART RATE: 78 BPM | OXYGEN SATURATION: 97 % | RESPIRATION RATE: 18 BRPM

## 2021-11-01 PROCEDURE — G0151 HHCP-SERV OF PT,EA 15 MIN: HCPCS

## 2021-11-01 NOTE — HOME HEALTH
Pt reports compliance with HEP, denies falls or LOB.  PT reviewed all HEP tasks and pt able to complete I'ly with good form/execution.  Pt has met all PT goals per POC

## 2021-12-13 ENCOUNTER — CALL CENTER PROGRAMS (OUTPATIENT)
Dept: CALL CENTER | Facility: HOSPITAL | Age: 86
End: 2021-12-13

## 2021-12-13 NOTE — OUTREACH NOTE
Stroke Sita Survey      Responses   Facility patient discharged from? [place]   Attempt successful No   Unsuccessful attempts Attempt 1          Laura Amin RN

## 2021-12-14 ENCOUNTER — CALL CENTER PROGRAMS (OUTPATIENT)
Dept: CALL CENTER | Facility: HOSPITAL | Age: 86
End: 2021-12-14

## 2021-12-14 NOTE — OUTREACH NOTE
Stroke Sita Survey      Responses   Facility patient discharged from? [place]   Attempt successful No   Unsuccessful attempts Attempt 2          Laura Amin RN

## 2021-12-15 ENCOUNTER — CALL CENTER PROGRAMS (OUTPATIENT)
Dept: CALL CENTER | Facility: HOSPITAL | Age: 86
End: 2021-12-15

## 2021-12-15 NOTE — OUTREACH NOTE
Stroke Sita Survey      Responses   Facility patient discharged from? [place]   Attempt successful No   Unsuccessful attempts Attempt 3   Call [facility] [place] Score 7          Laura Amin RN

## (undated) DEVICE — LEX NEURO ANGIOGRAPHY: Brand: MEDLINE INDUSTRIES, INC.

## (undated) DEVICE — CATH MIC PHENOM .021 .018IN 160CM

## (undated) DEVICE — STNT RETRV SOLITAIREX REVASCULARIZATION 3X20MM 10MM 150CM

## (undated) DEVICE — BALLOON GUIDE CATHETER: Brand: FLOWGATE2

## (undated) DEVICE — GUIDEWIRE WITH ICE™ HYDROPHILIC COATING: Brand: TRANSEND™ EX

## (undated) DEVICE — ST ACC MICROPUNCTURE .018 TRANSLSS/SS/TP 5F/10CM 21G/7CM

## (undated) DEVICE — ST EXT IV SMARTSITE 2VLV SP M LL 5ML IV1

## (undated) DEVICE — LIMB HOLDER, WRIST/ANKLE: Brand: DEROYAL

## (undated) DEVICE — ROTATING HEMOSTATIC VALVE .096": Brand: RHV

## (undated) DEVICE — INTRO SHEATH ENGAGE W/50 GW .038 8F12

## (undated) DEVICE — Device

## (undated) DEVICE — ST INF PRI SMRTSTE 20DRP 2VLV 24ML 117

## (undated) DEVICE — STPCK LP 1WY RA 200PSI

## (undated) DEVICE — ANGIO-SEAL VIP VASCULAR CLOSURE DEVICE: Brand: ANGIO-SEAL

## (undated) DEVICE — CATH TEMPO 5F BER 100CM: Brand: TEMPO

## (undated) DEVICE — STPCK 3/WY HP M/RA W/OFF/HNDL 1050PSI STRL